# Patient Record
Sex: FEMALE | Race: WHITE | Employment: FULL TIME | URBAN - METROPOLITAN AREA
[De-identification: names, ages, dates, MRNs, and addresses within clinical notes are randomized per-mention and may not be internally consistent; named-entity substitution may affect disease eponyms.]

---

## 2017-02-03 ENCOUNTER — OFFICE VISIT (OUTPATIENT)
Dept: FAMILY MEDICINE | Facility: CLINIC | Age: 27
End: 2017-02-03

## 2017-02-03 VITALS
HEIGHT: 65 IN | TEMPERATURE: 98 F | WEIGHT: 142 LBS | DIASTOLIC BLOOD PRESSURE: 70 MMHG | SYSTOLIC BLOOD PRESSURE: 118 MMHG | HEART RATE: 64 BPM | BODY MASS INDEX: 23.66 KG/M2

## 2017-02-03 DIAGNOSIS — Z01.818 PREOP GENERAL PHYSICAL EXAM: Primary | ICD-10-CM

## 2017-02-03 DIAGNOSIS — N97.9 FEMALE INFERTILITY: ICD-10-CM

## 2017-02-03 DIAGNOSIS — I49.3 PVC (PREMATURE VENTRICULAR CONTRACTION): ICD-10-CM

## 2017-02-03 DIAGNOSIS — B00.1 RECURRENT COLD SORES: ICD-10-CM

## 2017-02-03 DIAGNOSIS — Z71.89 PRENATAL CONSULT: ICD-10-CM

## 2017-02-03 DIAGNOSIS — N80.9 ENDOMETRIOSIS: ICD-10-CM

## 2017-02-03 LAB
ERYTHROCYTE [DISTWIDTH] IN BLOOD BY AUTOMATED COUNT: 10.8 %
HCT VFR BLD AUTO: 42.5 % (ref 35–47)
HEMOGLOBIN: 13.9 G/DL (ref 11.7–15.7)
MCH RBC QN AUTO: 29.5 PG (ref 26–33)
MCHC RBC AUTO-ENTMCNC: 32.7 G/DL (ref 31–36)
MCV RBC AUTO: 90.3 FL (ref 78–100)
PLATELET COUNT - QUEST: 277 10^9/L (ref 150–375)
PREG. TEST: NORMAL
RBC # BLD AUTO: 4.71 10*12/L (ref 3.8–5.2)
WBC # BLD AUTO: 9.8 10*9/L (ref 4–11)

## 2017-02-03 PROCEDURE — 99214 OFFICE O/P EST MOD 30 MIN: CPT | Performed by: PHYSICIAN ASSISTANT

## 2017-02-03 PROCEDURE — 81025 URINE PREGNANCY TEST: CPT | Performed by: PHYSICIAN ASSISTANT

## 2017-02-03 PROCEDURE — 85027 COMPLETE CBC AUTOMATED: CPT | Performed by: PHYSICIAN ASSISTANT

## 2017-02-03 PROCEDURE — 36415 COLL VENOUS BLD VENIPUNCTURE: CPT | Performed by: PHYSICIAN ASSISTANT

## 2017-02-03 RX ORDER — VALACYCLOVIR HYDROCHLORIDE 1 G/1
2000 TABLET, FILM COATED ORAL 2 TIMES DAILY
Qty: 4 TABLET | Refills: 11 | Status: SHIPPED | OUTPATIENT
Start: 2017-02-03 | End: 2017-11-10

## 2017-02-03 RX ORDER — PRENATAL VIT/IRON FUM/FOLIC AC 27MG-0.8MG
1 TABLET ORAL DAILY
Qty: 100 TABLET | Refills: 3 | Status: SHIPPED | OUTPATIENT
Start: 2017-02-03 | End: 2018-04-25

## 2017-02-03 NOTE — Clinical Note
[facility]  [address]  [address]  [facility] [address]  [phone]  Dept: [phone]          PRE-OP EVALUATION:  Today's date: 2/3/2017    Negar Vizcarra (: 1990) presents for pre-operative evaluation assessment as requested by Dr. Bishop.  She requires evaluation and anesthesia risk assessment prior to undergoing surgery/procedure for treatment of Left foot bunion removal .  Proposed procedure: left bunionectomy    Date of Surgery/ Procedure: 02/10/2017  Time of Surgery/ Procedure: AM  Hospital/Surgical Facility: [facility], [place]    Fax :[phone]  Primary Physician: Ce Siddiqi  Type of Anesthesia Anticipated: to be determined    Patient has a Health Care Directive or Living Will:  NO    1. NO - Do you have a history of heart attack, stroke, stent, bypass or surgery on an artery in the head, neck, heart or legs?  2. NO - Do you ever have any pain or discomfort in your chest?  3. NO - Do you have a history of  Heart Failure?  4. NO - Are you troubled by shortness of breath when: walking on the level, up a slight hill or at night?  5. NO - Do you currently have a cold, bronchitis or other respiratory infection?  6. NO - Do you have a cough, shortness of breath or wheezing?  7. NO - Do you sometimes get pains in the calves of your legs when you walk?  8. YES - Do you or anyone in your family have previous history of blood clots? Mother's father - reason unknown  9. NO - Do you or does anyone in your family have a serious bleeding problem such as prolonged bleeding following surgeries or cuts?  10. YES - Have you ever had problems with anemia or been told to take iron pills? As a child  11. NO - Have you had any abnormal blood loss such as black, tarry or bloody stools, or abnormal vaginal bleeding?  12. NO - Have you ever had a blood transfusion?  13. YES - Have you or any of your relatives ever had problems with anesthesia?  PONV  14. NO - Do you have sleep apnea, excessive snoring or daytime drowsiness?  15. NO - Do you have any prosthetic heart valves?  16. NO - Do you have prosthetic joints?  17. NO - Is there any chance that you may be pregnant?      HPI:                                                      Brief HPI related to upcoming procedure: Left foot pain.       See problem list for active medical problems.  Problems all longstanding and stable, except as noted/documented.  See ROS for pertinent symptoms related to these conditions.                                                                                                  .    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  Ultrasound 2016       Endometriosis 02/06/2014     Priority: Medium     Stage one endometriosis noted in the pelvis.  Excision of 5mm lesion from right uterosacral ligament and cauterization of an additional 7 implants done.   Tubal dye studies with patent tubes.  Distal tubes with no adhesions or abnormalities, fimbria fine and delicate.  Upper abdomen with no adhesions. MD Rhea       ACP (advance care planning) 12/20/2013     Priority: Medium     Advance Care Planning:   ACP Review and Resources Provided:  Reviewed chart for advance care plan.  Negar Vizcarra has no plan or code status on file. Discussed available resources and provided with information. Confirmed code status reflects current choices pending further ACP discussions.  Confirmed/documented designated decision maker(s). See permanent comments section of demographics in clinical tab.   Added by Cheyenne Woody on 2/11/2014               Female  infertility 12/20/2013     Priority: Medium     Health Care Home 12/20/2013     Priority: Low     State Tier Level:  Tier 1  Status:  NA  Care Coordinator:      See Letters for [facility] Care Plan            Past Medical History   Diagnosis Date     Dysmenorrhea      Gastro-oesophageal reflux disease      PVC (premature ventricular contraction)      Past Surgical History   Procedure Laterality Date     Cystectomy ovarian oncology  2008     [place]     Bunionectomy  2012     bilateral     Laparotomy exploratory       Laparoscopic tubal dye study  2/6/2014     Procedure: LAPAROSCOPIC TUBAL DYE STUDY;  LAPAROSCOPIC TUBAL DYE STUDIES, Cautery of Endometrosis, Excision Right Pelvic Endometrosis;  Surgeon: Daryl Jackson MD;  Location: [facility]     Laparoscopic ablation endometriosis  2/6/2014     Procedure: LAPAROSCOPIC ABLATION ENDOMETRIOSIS;;  Surgeon: Daryl Jackson MD;  Location: [facility]     Current Outpatient Prescriptions   Medication Sig Dispense Refill     valACYclovir (VALTREX) 1000 mg tablet Take 2 tablets (2,000 mg) by mouth 2 times daily 4 tablet 11     Prenatal Vit-Fe Fumarate-FA (PRENATAL MULTIVITAMIN  PLUS IRON) 27-0.8 MG TABS per tablet Take 1 tablet by mouth daily 100 tablet 3     minocycline (MINOCIN/DYNACIN) 100 MG capsule 1 tab PO BID 60 capsule 1     doxycycline Monohydrate 100 MG CAPS 1 tab PO BID with food and full glass of water 120 capsule 0     metroNIDAZOLE (METROCREAM) 0.75 % cream Apply to face and chest BID 45 g 11     fluconazole (DIFLUCAN) 150 MG tablet 1 tab PO at symptom onset; 1 tab 3 days later if not better 10 tablet 3     Sulfacetamide Sodium 10 % LIQD Use to wash face  mL 11     metoprolol (TOPROL XL) 25 MG 24 hr tablet Take 0.5 tablets (12.5 mg) by mouth daily 45 tablet 3     TRIMETHOPRIM PO Take 100 mg by mouth daily       OTC products: Aspirin daily.  Last dose today.    No Known Allergies   Latex Allergy: NO    Social History   Substance Use Topics     Smoking status:  "Never Smoker      Smokeless tobacco: Never Used     Alcohol Use: No     History   Drug Use No       REVIEW OF SYSTEMS:                                                    Constitutional, neuro, ENT, endocrine, pulmonary, cardiac, gastrointestinal, genitourinary, musculoskeletal, integument and psychiatric systems are negative, except as otherwise noted.    Cold sore on lower lip started today.  Gets cold sores rarely    EXAM:                                                    /70 mmHg  Pulse 64  Temp(Src) 98  F (36.7  C) (Oral)  Ht 1.651 m (5' 5\")  Wt 64.411 kg (142 lb)  BMI 23.63 kg/m2  LMP 01/23/2017    GENERAL APPEARANCE: healthy, alert and no distress     EYES: EOMI,- PERRL     HENT: ear canals and TM's normal and nose and mouth without ulcers or lesions     NECK: no adenopathy, no asymmetry, masses, or scars and thyroid normal to palpation     RESP: lungs clear to auscultation - no rales, rhonchi or wheezes     CV: regular rates and rhythm, normal S1 S2, no S3 or S4 and no murmur, click or rub -     ABDOMEN:  soft, nontender, no HSM or masses and bowel sounds normal    MS: extremities normal- no gross deformities noted, no evidence of inflammation in joints, FROM in all extremities.     SKIN: no suspicious lesions or rashes     NEURO: Normal strength and tone, sensory exam grossly normal, mentation intact and speech normal     PSYCH: mentation appears normal. and affect normal/bright      DIAGNOSTICS:                                                      Labs Resulted Today:   Results for orders placed or performed in visit on 02/03/17   HCL Urine Pregnancy Test (BFP)   Result Value Ref Range    Pregnancy Test neg    HEMOGRAM/PLATELET (BFP)   Result Value Ref Range    WBC 9.8 4.0 - 11 10*9/L    RBC Count 4.71 3.8 - 5.2 10*12/L    Hemoglobin 13.9 11.7 - 15.7 g/dL    Hematocrit 42.5 35.0 - 47.0 %    MCV 90.3 78 - 100 fL    MCH 29.5 26 - 33 pg    MCHC 32.7 31 - 36 g/dL    RDW 10.8 %    Platelet Count 277 " 150 - 375 10^9/L       Recent Labs   Lab Test  02/01/14   1844   HGB  13.3   PLT  261   NA  139   POTASSIUM  4.0   CR  0.75        IMPRESSION:                                                    Reason for surgery/procedure: Left bunionectomy  Diagnosis/reason for consult:  Preoperative clearance      The proposed surgical procedure is considered INTERMEDIATE risk.    REVISED CARDIAC RISK INDEX  The patient has the following serious cardiovascular risks for perioperative complications such as (MI, PE, VFib and 3  AV Block):  No serious cardiac risks  INTERPRETATION: 0 risks: Class I (very low risk - 0.4% complication rate)    The patient has the following additional risks for perioperative complications:  No identified additional risks      ICD-10-CM    1. Preop general physical exam Z01.818 HCL Urine Pregnancy Test (BFP)     VENOUS COLLECTION     HEMOGRAM/PLATELET (BFP)   2. Recurrent cold sores B00.1 valACYclovir (VALTREX) 1000 mg tablet   3. Prenatal consult Z71.89 Prenatal Vit-Fe Fumarate-FA (PRENATAL MULTIVITAMIN  PLUS IRON) 27-0.8 MG TABS per tablet   4. Female infertility N97.9    5. PVC (premature ventricular contraction) I49.3    6. Endometriosis N80.9        RECOMMENDATIONS:                                                        I would recommend a urine pregnancy test the day of the surgery.      Hold all meds day of surgery    APPROVAL GIVEN to proceed with proposed procedure, without further diagnostic evaluation       Signed Electronically by: Ce Siddiqi PA-C    Copy of this evaluation report is provided to requesting physician.    [facility] Preop Guidelines

## 2017-02-03 NOTE — PROGRESS NOTES
[facility]  [address]  [address]  [facility] [address]  [phone]  Dept: [phone]          PRE-OP EVALUATION:  Today's date: 2/3/2017    Negar Vizcarra (: 1990) presents for pre-operative evaluation assessment as requested by Dr. Bishop.  She requires evaluation and anesthesia risk assessment prior to undergoing surgery/procedure for treatment of Left foot bunion removal .  Proposed procedure: left bunionectomy    Date of Surgery/ Procedure: 02/10/2017  Time of Surgery/ Procedure: AM  Hospital/Surgical Facility: [facility]    Fax :[phone]  Primary Physician: Ce Siddiqi  Type of Anesthesia Anticipated: to be determined    Patient has a Health Care Directive or Living Will:  NO    1. NO - Do you have a history of heart attack, stroke, stent, bypass or surgery on an artery in the head, neck, heart or legs?  2. NO - Do you ever have any pain or discomfort in your chest?  3. NO - Do you have a history of  Heart Failure?  4. NO - Are you troubled by shortness of breath when: walking on the level, up a slight hill or at night?  5. NO - Do you currently have a cold, bronchitis or other respiratory infection?  6. NO - Do you have a cough, shortness of breath or wheezing?  7. NO - Do you sometimes get pains in the calves of your legs when you walk?  8. YES - Do you or anyone in your family have previous history of blood clots? Mother's father - reason unknown  9. NO - Do you or does anyone in your family have a serious bleeding problem such as prolonged bleeding following surgeries or cuts?  10. YES - Have you ever had problems with anemia or been told to take iron pills? As a child  11. NO - Have you had any abnormal blood loss such as black, tarry or bloody stools, or abnormal vaginal bleeding?  12. NO - Have you ever had a blood transfusion?  13. YES - Have you or any of your relatives ever had problems with anesthesia?  PONV  14. NO - Do you have sleep apnea, excessive snoring or daytime drowsiness?  15. NO - Do you have any prosthetic heart valves?  16. NO - Do you have prosthetic joints?  17. NO - Is there any chance that you may be pregnant?      HPI:                                                      Brief HPI related to upcoming procedure: Left foot pain.       See problem list for active medical problems.  Problems all longstanding and stable, except as noted/documented.  See ROS for pertinent symptoms related to these conditions.                                                                                                  .    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  Ultrasound 2016       Endometriosis 02/06/2014     Priority: Medium     Stage one endometriosis noted in the pelvis.  Excision of 5mm lesion from right uterosacral ligament and cauterization of an additional 7 implants done.   Tubal dye studies with patent tubes.  Distal tubes with no adhesions or abnormalities, fimbria fine and delicate.  Upper abdomen with no adhesions. MD Rhea       ACP (advance care planning) 12/20/2013     Priority: Medium     Advance Care Planning:   ACP Review and Resources Provided:  Reviewed chart for advance care plan.  Negar Vizcarra has no plan or code status on file. Discussed available resources and provided with information. Confirmed code status reflects current choices pending further ACP discussions.  Confirmed/documented designated decision maker(s). See permanent comments section of demographics in clinical tab.   Added by Cheyenne Woody on 2/11/2014               Female  infertility 12/20/2013     Priority: Medium     Health Care Home 12/20/2013     Priority: Low     State Tier Level:  Tier 1  Status:  NA  Care Coordinator:      See Letters for [facility] Care Plan            Past Medical History   Diagnosis Date     Dysmenorrhea      Gastro-oesophageal reflux disease      PVC (premature ventricular contraction)      Past Surgical History   Procedure Laterality Date     Cystectomy ovarian oncology  2008     [place]     Bunionectomy  2012     bilateral     Laparotomy exploratory       Laparoscopic tubal dye study  2/6/2014     Procedure: LAPAROSCOPIC TUBAL DYE STUDY;  LAPAROSCOPIC TUBAL DYE STUDIES, Cautery of Endometrosis, Excision Right Pelvic Endometrosis;  Surgeon: Daryl Jackson MD;  Location: [facility]     Laparoscopic ablation endometriosis  2/6/2014     Procedure: LAPAROSCOPIC ABLATION ENDOMETRIOSIS;;  Surgeon: Daryl Jackson MD;  Location: [facility]     Current Outpatient Prescriptions   Medication Sig Dispense Refill     valACYclovir (VALTREX) 1000 mg tablet Take 2 tablets (2,000 mg) by mouth 2 times daily 4 tablet 11     Prenatal Vit-Fe Fumarate-FA (PRENATAL MULTIVITAMIN  PLUS IRON) 27-0.8 MG TABS per tablet Take 1 tablet by mouth daily 100 tablet 3     minocycline (MINOCIN/DYNACIN) 100 MG capsule 1 tab PO BID 60 capsule 1     doxycycline Monohydrate 100 MG CAPS 1 tab PO BID with food and full glass of water 120 capsule 0     metroNIDAZOLE (METROCREAM) 0.75 % cream Apply to face and chest BID 45 g 11     fluconazole (DIFLUCAN) 150 MG tablet 1 tab PO at symptom onset; 1 tab 3 days later if not better 10 tablet 3     Sulfacetamide Sodium 10 % LIQD Use to wash face  mL 11     metoprolol (TOPROL XL) 25 MG 24 hr tablet Take 0.5 tablets (12.5 mg) by mouth daily 45 tablet 3     TRIMETHOPRIM PO Take 100 mg by mouth daily       OTC products: Aspirin daily.  Last dose today.    No Known Allergies   Latex Allergy: NO    Social History   Substance Use Topics     Smoking status:  "Never Smoker      Smokeless tobacco: Never Used     Alcohol Use: No     History   Drug Use No       REVIEW OF SYSTEMS:                                                    Constitutional, neuro, ENT, endocrine, pulmonary, cardiac, gastrointestinal, genitourinary, musculoskeletal, integument and psychiatric systems are negative, except as otherwise noted.    Cold sore on lower lip started today.  Gets cold sores rarely    EXAM:                                                    /70 mmHg  Pulse 64  Temp(Src) 98  F (36.7  C) (Oral)  Ht 1.651 m (5' 5\")  Wt 64.411 kg (142 lb)  BMI 23.63 kg/m2  LMP 01/23/2017    GENERAL APPEARANCE: healthy, alert and no distress     EYES: EOMI,- PERRL     HENT: ear canals and TM's normal and nose and mouth without ulcers or lesions     NECK: no adenopathy, no asymmetry, masses, or scars and thyroid normal to palpation     RESP: lungs clear to auscultation - no rales, rhonchi or wheezes     CV: regular rates and rhythm, normal S1 S2, no S3 or S4 and no murmur, click or rub -     ABDOMEN:  soft, nontender, no HSM or masses and bowel sounds normal    MS: extremities normal- no gross deformities noted, no evidence of inflammation in joints, FROM in all extremities.     SKIN: no suspicious lesions or rashes     NEURO: Normal strength and tone, sensory exam grossly normal, mentation intact and speech normal     PSYCH: mentation appears normal. and affect normal/bright      DIAGNOSTICS:                                                      Labs Resulted Today:   Results for orders placed or performed in visit on 02/03/17   HCL Urine Pregnancy Test (BFP)   Result Value Ref Range    Pregnancy Test neg    HEMOGRAM/PLATELET (BFP)   Result Value Ref Range    WBC 9.8 4.0 - 11 10*9/L    RBC Count 4.71 3.8 - 5.2 10*12/L    Hemoglobin 13.9 11.7 - 15.7 g/dL    Hematocrit 42.5 35.0 - 47.0 %    MCV 90.3 78 - 100 fL    MCH 29.5 26 - 33 pg    MCHC 32.7 31 - 36 g/dL    RDW 10.8 %    Platelet Count 277 " 150 - 375 10^9/L       Recent Labs   Lab Test  02/01/14   1844   HGB  13.3   PLT  261   NA  139   POTASSIUM  4.0   CR  0.75        IMPRESSION:                                                    Reason for surgery/procedure: Left bunionectomy  Diagnosis/reason for consult:  Preoperative clearance      The proposed surgical procedure is considered INTERMEDIATE risk.    REVISED CARDIAC RISK INDEX  The patient has the following serious cardiovascular risks for perioperative complications such as (MI, PE, VFib and 3  AV Block):  No serious cardiac risks  INTERPRETATION: 0 risks: Class I (very low risk - 0.4% complication rate)    The patient has the following additional risks for perioperative complications:  No identified additional risks      ICD-10-CM    1. Preop general physical exam Z01.818 HCL Urine Pregnancy Test (BFP)     VENOUS COLLECTION     HEMOGRAM/PLATELET (BFP)   2. Recurrent cold sores B00.1 valACYclovir (VALTREX) 1000 mg tablet   3. Prenatal consult Z71.89 Prenatal Vit-Fe Fumarate-FA (PRENATAL MULTIVITAMIN  PLUS IRON) 27-0.8 MG TABS per tablet   4. Female infertility N97.9    5. PVC (premature ventricular contraction) I49.3    6. Endometriosis N80.9        RECOMMENDATIONS:                                                        I would recommend a urine pregnancy test the day of the surgery.      Hold all meds day of surgery    APPROVAL GIVEN to proceed with proposed procedure, without further diagnostic evaluation       Signed Electronically by: Ce Siddiqi PA-C      Copy of this evaluation report is provided to requesting physician.    [facility] Preop Guidelines

## 2017-02-03 NOTE — Clinical Note
[facility]  [address]  [address]  [facility] [address]  [phone]  Dept: [phone]          PRE-OP EVALUATION:  Today's date: 2/3/2017    Negar Vizcarra (: 1990) presents for pre-operative evaluation assessment as requested by Dr. Bishop.  She requires evaluation and anesthesia risk assessment prior to undergoing surgery/procedure for treatment of Left foot bunion removal .  Proposed procedure: left bunionectomy    Date of Surgery/ Procedure: 02/10/2017  Time of Surgery/ Procedure: AM  Hospital/Surgical Facility[facility] [place]    Fax :[phone]  Primary Physician: Ce Siddiqi  Type of Anesthesia Anticipated: to be determined    Patient has a Health Care Directive or Living Will:  NO    1. NO - Do you have a history of heart attack, stroke, stent, bypass or surgery on an artery in the head, neck, heart or legs?  2. NO - Do you ever have any pain or discomfort in your chest?  3. NO - Do you have a history of  Heart Failure?  4. NO - Are you troubled by shortness of breath when: walking on the level, up a slight hill or at night?  5. NO - Do you currently have a cold, bronchitis or other respiratory infection?  6. NO - Do you have a cough, shortness of breath or wheezing?  7. NO - Do you sometimes get pains in the calves of your legs when you walk?  8. YES - Do you or anyone in your family have previous history of blood clots? Mother's father - reason unknown  9. NO - Do you or does anyone in your family have a serious bleeding problem such as prolonged bleeding following surgeries or cuts?  10. YES - Have you ever had problems with anemia or been told to take iron pills? As a child  11. NO - Have you had any abnormal blood loss such as black, tarry or bloody stools, or abnormal vaginal bleeding?  12. NO - Have you ever had a blood transfusion?  13. YES - Have you or any of your relatives ever had problems with anesthesia?  PONV  14. NO - Do you have sleep apnea, excessive snoring or daytime drowsiness?  15. NO - Do you have any prosthetic heart valves?  16. NO - Do you have prosthetic joints?  17. NO - Is there any chance that you may be pregnant?      HPI:                                                      Brief HPI related to upcoming procedure: Left foot pain.       See problem list for active medical problems.  Problems all longstanding and stable, except as noted/documented.  See ROS for pertinent symptoms related to these conditions.                                                                                                  .    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  Ultrasound 2016       Endometriosis 02/06/2014     Priority: Medium     Stage one endometriosis noted in the pelvis.  Excision of 5mm lesion from right uterosacral ligament and cauterization of an additional 7 implants done.   Tubal dye studies with patent tubes.  Distal tubes with no adhesions or abnormalities, fimbria fine and delicate.  Upper abdomen with no adhesions. MD Rhea       ACP (advance care planning) 12/20/2013     Priority: Medium     Advance Care Planning:   ACP Review and Resources Provided:  Reviewed chart for advance care plan.  Negar Vizcarra has no plan or code status on file. Discussed available resources and provided with information. Confirmed code status reflects current choices pending further ACP discussions.  Confirmed/documented designated decision maker(s). See permanent comments section of demographics in clinical tab.   Added by Cheyenne Woody on 2/11/2014               Female  infertility 12/20/2013     Priority: Medium     Health Care Home 12/20/2013     Priority: Low     State Tier Level:  Tier 1  Status:  NA  Care Coordinator:      See Letters for [facility] Care Plan            Past Medical History   Diagnosis Date     Dysmenorrhea      Gastro-oesophageal reflux disease      PVC (premature ventricular contraction)      Past Surgical History   Procedure Laterality Date     Cystectomy ovarian oncology  2008     [place]     Bunionectomy  2012     bilateral     Laparotomy exploratory       Laparoscopic tubal dye study  2/6/2014     Procedure: LAPAROSCOPIC TUBAL DYE STUDY;  LAPAROSCOPIC TUBAL DYE STUDIES, Cautery of Endometrosis, Excision Right Pelvic Endometrosis;  Surgeon: Daryl Jackson MD;  Location: [facility]     Laparoscopic ablation endometriosis  2/6/2014     Procedure: LAPAROSCOPIC ABLATION ENDOMETRIOSIS;;  Surgeon: Daryl Jackson MD;  Location: [facility]     Current Outpatient Prescriptions   Medication Sig Dispense Refill     valACYclovir (VALTREX) 1000 mg tablet Take 2 tablets (2,000 mg) by mouth 2 times daily 4 tablet 11     Prenatal Vit-Fe Fumarate-FA (PRENATAL MULTIVITAMIN  PLUS IRON) 27-0.8 MG TABS per tablet Take 1 tablet by mouth daily 100 tablet 3     minocycline (MINOCIN/DYNACIN) 100 MG capsule 1 tab PO BID 60 capsule 1     doxycycline Monohydrate 100 MG CAPS 1 tab PO BID with food and full glass of water 120 capsule 0     metroNIDAZOLE (METROCREAM) 0.75 % cream Apply to face and chest BID 45 g 11     fluconazole (DIFLUCAN) 150 MG tablet 1 tab PO at symptom onset; 1 tab 3 days later if not better 10 tablet 3     Sulfacetamide Sodium 10 % LIQD Use to wash face  mL 11     metoprolol (TOPROL XL) 25 MG 24 hr tablet Take 0.5 tablets (12.5 mg) by mouth daily 45 tablet 3     TRIMETHOPRIM PO Take 100 mg by mouth daily       OTC products: Aspirin daily.  Last dose today.    No Known Allergies   Latex Allergy: NO    Social History   Substance Use Topics     Smoking status:  "Never Smoker      Smokeless tobacco: Never Used     Alcohol Use: No     History   Drug Use No       REVIEW OF SYSTEMS:                                                    Constitutional, neuro, ENT, endocrine, pulmonary, cardiac, gastrointestinal, genitourinary, musculoskeletal, integument and psychiatric systems are negative, except as otherwise noted.    Cold sore on lower lip started today.  Gets cold sores rarely    EXAM:                                                    /70 mmHg  Pulse 64  Temp(Src) 98  F (36.7  C) (Oral)  Ht 1.651 m (5' 5\")  Wt 64.411 kg (142 lb)  BMI 23.63 kg/m2  LMP 01/23/2017    GENERAL APPEARANCE: healthy, alert and no distress     EYES: EOMI,- PERRL     HENT: ear canals and TM's normal and nose and mouth without ulcers or lesions     NECK: no adenopathy, no asymmetry, masses, or scars and thyroid normal to palpation     RESP: lungs clear to auscultation - no rales, rhonchi or wheezes     BREAST: normal without masses, tenderness or nipple discharge and no palpable axillary masses or adenopathy     CV: regular rates and rhythm, normal S1 S2, no S3 or S4 and no murmur, click or rub -     ABDOMEN:  soft, nontender, no HSM or masses and bowel sounds normal     : normal cervix, adnexae, and uterus without masses or discharge and rectal exam normal without masses-guaiac negative stool     MS: extremities normal- no gross deformities noted, no evidence of inflammation in joints, FROM in all extremities.     SKIN: no suspicious lesions or rashes     NEURO: Normal strength and tone, sensory exam grossly normal, mentation intact and speech normal     PSYCH: mentation appears normal. and affect normal/bright     LYMPHATICS: No axillary, cervical, inguinal, or supraclavicular nodes    DIAGNOSTICS:                                                      Labs Resulted Today:   Results for orders placed or performed in visit on 02/03/17   HCL Urine Pregnancy Test (BFP)   Result Value Ref " Range    Pregnancy Test neg    HEMOGRAM/PLATELET (BFP)   Result Value Ref Range    WBC 9.8 4.0 - 11 10*9/L    RBC Count 4.71 3.8 - 5.2 10*12/L    Hemoglobin 13.9 11.7 - 15.7 g/dL    Hematocrit 42.5 35.0 - 47.0 %    MCV 90.3 78 - 100 fL    MCH 29.5 26 - 33 pg    MCHC 32.7 31 - 36 g/dL    RDW 10.8 %    Platelet Count 277 150 - 375 10^9/L       Recent Labs   Lab Test  02/01/14   1844   HGB  13.3   PLT  261   NA  139   POTASSIUM  4.0   CR  0.75        IMPRESSION:                                                    Reason for surgery/procedure: Left bunionectomy  Diagnosis/reason for consult:  Preoperative clearance      The proposed surgical procedure is considered INTERMEDIATE risk.    REVISED CARDIAC RISK INDEX  The patient has the following serious cardiovascular risks for perioperative complications such as (MI, PE, VFib and 3  AV Block):  No serious cardiac risks  INTERPRETATION: 0 risks: Class I (very low risk - 0.4% complication rate)    The patient has the following additional risks for perioperative complications:  No identified additional risks      ICD-10-CM    1. Preop general physical exam Z01.818 HCL Urine Pregnancy Test (BFP)     VENOUS COLLECTION     HEMOGRAM/PLATELET (BFP)   2. Recurrent cold sores B00.1 valACYclovir (VALTREX) 1000 mg tablet   3. Prenatal consult Z71.89 Prenatal Vit-Fe Fumarate-FA (PRENATAL MULTIVITAMIN  PLUS IRON) 27-0.8 MG TABS per tablet   4. Female infertility N97.9    5. PVC (premature ventricular contraction) I49.3    6. Endometriosis N80.9        RECOMMENDATIONS:                                                        I would recommend a urine pregnancy test the day of the surgery.      Hold all meds day of surgery    APPROVAL GIVEN to proceed with proposed procedure, without further diagnostic evaluation       Signed Electronically by: Ce Siddiqi PA-C    Copy of this evaluation report is provided to requesting physician.    [facility] Preop Guidelines

## 2017-02-07 ENCOUNTER — TELEPHONE (OUTPATIENT)
Dept: FAMILY MEDICINE | Facility: CLINIC | Age: 27
End: 2017-02-07

## 2017-02-07 DIAGNOSIS — Z01.818 PREOP GENERAL PHYSICAL EXAM: Primary | ICD-10-CM

## 2017-02-07 NOTE — TELEPHONE ENCOUNTER
[facility] Lab orders written - to be drawn Thursday.   notified.    Ce Siddiqi PA-C  2/7/2017

## 2017-02-07 NOTE — TELEPHONE ENCOUNTER
Dr. Blair podiatry concerned that pt not abstaining from intercourse prior to surgery.     Pt needs serum HCG on Thursday.    We will Fax to [facility] with results     Fax no: [phone]    Ce Siddiqi PA-C  2/7/2017

## 2017-02-07 NOTE — TELEPHONE ENCOUNTER
pt called the clinical support line with the following;     Bharati called [phone]    Says surgical center is requesting a hCG blood test before her preop. Pt would like to come in for a lab only, on thursday, pt preop is on 10 Friday?  Please put in order or advise, Jo Ann  [phone] (home)

## 2017-02-08 ENCOUNTER — TELEPHONE (OUTPATIENT)
Dept: DERMATOLOGY | Facility: CLINIC | Age: 27
End: 2017-02-08

## 2017-02-08 NOTE — TELEPHONE ENCOUNTER
Reason for Call:  Other call back    Detailed comments: Pt is trying to get pregnant and is on some medications that she shouldn't be on for that reason.    Phone Number Patient can be reached at: Other phone number:  [phone]  Ce Siddiqi, a Physician Assistant from  [facility]    Best Time: 9am-3pm-- 2/10/17    Can we leave a detailed message on this number? YES    Call taken on 2/8/2017 at 2:45 PM by HEMALATHA SOLIS

## 2017-02-09 ENCOUNTER — HOSPITAL ENCOUNTER (OUTPATIENT)
Dept: LAB | Facility: CLINIC | Age: 27
Discharge: HOME | End: 2017-02-09
Attending: PHYSICIAN ASSISTANT | Admitting: PHYSICIAN ASSISTANT
Payer: COMMERCIAL

## 2017-02-09 DIAGNOSIS — Z01.818 PREOP EXAMINATION: Primary | ICD-10-CM

## 2017-02-09 LAB — B-HCG SERPL-ACNC: <1 IU/L

## 2017-02-09 PROCEDURE — 84702 CHORIONIC GONADOTROPIN TEST: CPT | Performed by: PHYSICIAN ASSISTANT

## 2017-02-09 PROCEDURE — 36415 COLL VENOUS BLD VENIPUNCTURE: CPT | Performed by: PHYSICIAN ASSISTANT

## 2017-02-09 NOTE — TELEPHONE ENCOUNTER
Called Ce back. She just wanted to  Know that we are aware patient was trying to get pregnant. Marta is aware. Issue discussed with patient. Pt been trying to get pregnant for a while and pt was notified she needs to stop all abx if she does get pregnant. Pt is currently not taking the meds listed below.

## 2017-02-24 ENCOUNTER — OFFICE VISIT (OUTPATIENT)
Dept: FAMILY MEDICINE | Facility: CLINIC | Age: 27
End: 2017-02-24

## 2017-02-24 VITALS
SYSTOLIC BLOOD PRESSURE: 126 MMHG | BODY MASS INDEX: 23.6 KG/M2 | OXYGEN SATURATION: 98 % | DIASTOLIC BLOOD PRESSURE: 84 MMHG | TEMPERATURE: 97.8 F | WEIGHT: 141.8 LBS | HEART RATE: 89 BPM

## 2017-02-24 DIAGNOSIS — B37.31 YEAST INFECTION OF THE VAGINA: Primary | ICD-10-CM

## 2017-02-24 DIAGNOSIS — N76.0 BACTERIAL VAGINAL INFECTION: ICD-10-CM

## 2017-02-24 DIAGNOSIS — B96.89 BACTERIAL VAGINAL INFECTION: ICD-10-CM

## 2017-02-24 DIAGNOSIS — N30.00 ACUTE CYSTITIS WITHOUT HEMATURIA: ICD-10-CM

## 2017-02-24 LAB
ALBUMIN (URINE): ABNORMAL MG/DL
APPEARANCE UR: ABNORMAL
BACTERIA (WET PREP): ABNORMAL
BACTERIA, UR: ABNORMAL
BILIRUB UR QL: ABNORMAL
CASTS/LPF: ABNORMAL
CLUE CELLS: ABNORMAL
COLOR UR: YELLOW
EP/HPF: ABNORMAL
GLUCOSE URINE: ABNORMAL MG/DL
HGB UR QL: ABNORMAL
KETONES UR QL: ABNORMAL MG/DL
LEUKOCYTE ESTERASE - QUEST: ABNORMAL
MISC.: ABNORMAL
NITRITE UR QL STRIP: ABNORMAL
PH FLUID SOURCE: 6 (ref 3.5–4.5)
PH UR STRIP: 7 PH (ref 5–7)
PMNS (WET PREP): ABNORMAL
RBC, UR MICRO: ABNORMAL (ref ?–2)
SP. GRAVITY: 1.01
TRICHOMONAS VAGINALS: ABNORMAL
UROBILINOGEN UR QL STRIP: 0.2 EU/DL (ref 0.2–1)
WBC, UR MICRO: ABNORMAL (ref ?–2)
YEAST CELLS: ABNORMAL

## 2017-02-24 PROCEDURE — 81001 URINALYSIS AUTO W/SCOPE: CPT | Performed by: PHYSICIAN ASSISTANT

## 2017-02-24 PROCEDURE — 99213 OFFICE O/P EST LOW 20 MIN: CPT | Performed by: PHYSICIAN ASSISTANT

## 2017-02-24 PROCEDURE — 87088 URINE BACTERIA CULTURE: CPT | Mod: 90 | Performed by: PHYSICIAN ASSISTANT

## 2017-02-24 PROCEDURE — 87210 SMEAR WET MOUNT SALINE/INK: CPT | Performed by: PHYSICIAN ASSISTANT

## 2017-02-24 PROCEDURE — 87086 URINE CULTURE/COLONY COUNT: CPT | Mod: 90 | Performed by: PHYSICIAN ASSISTANT

## 2017-02-24 RX ORDER — TAMSULOSIN HYDROCHLORIDE 0.4 MG/1
0.4 CAPSULE ORAL DAILY
COMMUNITY
End: 2017-05-18

## 2017-02-24 RX ORDER — CIPROFLOXACIN 500 MG/1
500 TABLET, FILM COATED ORAL 2 TIMES DAILY
Qty: 14 TABLET | Refills: 0 | Status: SHIPPED | OUTPATIENT
Start: 2017-02-24 | End: 2017-05-18

## 2017-02-24 RX ORDER — SULFAMETHOXAZOLE AND TRIMETHOPRIM 400; 80 MG/1; MG/1
TABLET ORAL
Qty: 30 TABLET | Refills: 0 | Status: CANCELLED | OUTPATIENT
Start: 2017-02-24

## 2017-02-24 RX ORDER — METRONIDAZOLE 7.5 MG/G
1 GEL VAGINAL 2 TIMES DAILY
Qty: 70 G | Refills: 0 | Status: SHIPPED | OUTPATIENT
Start: 2017-02-24 | End: 2017-03-01

## 2017-02-24 RX ORDER — NITROFURANTOIN 25; 75 MG/1; MG/1
CAPSULE ORAL
Qty: 20 CAPSULE | Refills: 0 | Status: CANCELLED | OUTPATIENT
Start: 2017-02-24

## 2017-02-24 NOTE — PROGRESS NOTES
SUBJECTIVE:                                                    Negar Vizcarra is a 26 year old female who presents to clinic today for the following health issues:    UTI sx: Monday am after intercourse - frequency, increased dysuria.  Took Macrobid - makes her nauseous  Took Flomax that evening  Sx improved over the next couple days    This am has also had vaginal itching, clear discharge - stringy.     Took Diflucan today.     Due to language barrier, an  was present during the history-taking and subsequent discussion (and for part of the physical exam) with this patient.        Labs reviewed in EPIC  BP Readings from Last 3 Encounters:   02/24/17 126/84   02/03/17 118/70   12/30/16 140/84    Wt Readings from Last 3 Encounters:   02/24/17 64.3 kg (141 lb 12.8 oz)   02/03/17 64.4 kg (142 lb)   10/14/16 63.5 kg (140 lb)                  Patient Active Problem List   Diagnosis     ACP (advance care planning)     Health Care Home     Female infertility     Endometriosis     Breast cyst, right     PVC (premature ventricular contraction)     Recurrent cold sores     Past Surgical History   Procedure Laterality Date     Cystectomy ovarian oncology  2008     [place]     Bunionectomy  2012     bilateral     Laparotomy exploratory       Laparoscopic tubal dye study  2/6/2014     Procedure: LAPAROSCOPIC TUBAL DYE STUDY;  LAPAROSCOPIC TUBAL DYE STUDIES, Cautery of Endometrosis, Excision Right Pelvic Endometrosis;  Surgeon: Daryl Jackson MD;  Location: [facility]     Laparoscopic ablation endometriosis  2/6/2014     Procedure: LAPAROSCOPIC ABLATION ENDOMETRIOSIS;;  Surgeon: Daryl Jackson MD;  Location: [facility]       Social History   Substance Use Topics     Smoking status: Never Smoker     Smokeless tobacco: Never Used     Alcohol use No     Family History   Problem Relation Age of Onset     Hypertension Mother      Hypertension Father      DIABETES Maternal Grandmother      CEREBROVASCULAR DISEASE Maternal  Grandfather      CEREBROVASCULAR DISEASE Father      Lipids Father          Current Outpatient Prescriptions   Medication Sig Dispense Refill     tamsulosin (FLOMAX) 0.4 MG capsule Take 0.4 mg by mouth daily       metroNIDAZOLE (METROGEL) 0.75 % vaginal gel Place 1 applicator vaginally 2 times daily for 5 days 70 g 0     ciprofloxacin (CIPRO) 500 MG tablet Take 1 tablet (500 mg) by mouth 2 times daily 14 tablet 0     valACYclovir (VALTREX) 1000 mg tablet Take 2 tablets (2,000 mg) by mouth 2 times daily 4 tablet 11     metroNIDAZOLE (METROCREAM) 0.75 % cream Apply to face and chest BID 45 g 11     Sulfacetamide Sodium 10 % LIQD Use to wash face  mL 11     Prenatal Vit-Fe Fumarate-FA (PRENATAL MULTIVITAMIN  PLUS IRON) 27-0.8 MG TABS per tablet Take 1 tablet by mouth daily (Patient not taking: Reported on 2/24/2017) 100 tablet 3     metoprolol (TOPROL XL) 25 MG 24 hr tablet Take 0.5 tablets (12.5 mg) by mouth daily (Patient not taking: Reported on 2/24/2017) 45 tablet 3     TRIMETHOPRIM PO Take 100 mg by mouth daily Reported on 2/24/2017       Allergies   Allergen Reactions     Doxycycline Nausea and Nausea and Vomiting     Recent Labs   Lab Test  02/01/14   1844   ALT  26   CR  0.75   GFRESTIMATED  >90   GFRESTBLACK  >90   POTASSIUM  4.0            OBJECTIVE:                                                    /84 (BP Location: Right arm, Patient Position: Chair, Cuff Size: Adult Regular)  Pulse 89  Temp 97.8  F (36.6  C) (Oral)  Wt 64.3 kg (141 lb 12.8 oz)  LMP 01/23/2017  SpO2 98%  BMI 23.6 kg/m2   Body mass index is 23.6 kg/(m^2).   GENERAL: healthy, alert, well nourished, well hydrated, no distress  CV: regular rates and rhythm, normal S1 S2, no S3 or S4 and no murmur, no click or rub -  ABDOMEN: soft, no tenderness, no  hepatosplenomegaly, no masses, normal bowel sounds  Gu: normal appearance of external genitalia, vaginal mucosa.  Mild white thick discharge present.     Labs Resulted Today:   Results  for orders placed or performed in visit on 02/24/17   HCL URINALYSIS, ROUTINE   Result Value Ref Range    Color Urine Yellow     Appearance Urine Cloudy (A)     Glucose Urine Neg neg mg/dL    Bilirubin Urine Neg neg    Ketones Urine Neg neg mg/dL    Specific Gravity 1.015     Blood Urine Small (A) neg    pH Urine 7.0 5.0 - 7.0 pH    Albumin Urine neg neg - neg mg/dL    Urobilinogen Urine 0.2 0.2 - 1.0 EU/dL    Nitrite Urine Neg NEG    Leukocyte Esterase Large (A) neg - neg    Wbc, Urine Micro 50-60 (A) neg - 2    RBC Micro Urine 3-5 (A) neg - 2    EP/HPF few     Bacteria Urine small (A) neg - neg    Casts/LPF neg     Miscellaneous WBC clumping (A)    A WET PREP (BFP)   Result Value Ref Range    Trichomonas Vaginals NEG     yeast cells neg     PMNs Wet Prep small (A)     Clue cells POS (A)     Bacteria (Wet Prep) LARGE-BIJU (A)     pH Fluid Source 6.0 (A) 3.5 - 4.5            ASSESSMENT/PLAN:                                                      (B37.3) Yeast infection of the vagina  (primary encounter diagnosis)  Plan: A WET PREP (BFP)  Repeat diflucan if sx persist after Metrogel    (N30.00) Acute cystitis without hematuria  Plan: Urine Culture Aerobic Bacterial  Cirpo  Advised prn abx with intercourse   Will discuss with urologist in 2 weeks    (N76.0,  B96.89) Bacterial vaginal infection  Plan: metroNIDAZOLE (METROGEL) 0.75 % vaginal gel     No EtOH, no intercourse  During tx.         GABY Chou  [facility]

## 2017-02-24 NOTE — MR AVS SNAPSHOT
After Visit Summary   2/24/2017    Negar Vizcarra    MRN: [id number]           Patient Information     Date Of Birth          1990        Visit Information        Provider Department      2/24/2017 1:45 PM Ce Siddiqi PA [facility]        Today's Diagnoses     Yeast infection of the vagina    -  1    Acute cystitis without hematuria        Bacterial vaginal infection           Follow-ups after your visit        Who to contact     If you have questions or need follow up information about today's clinic visit or your schedule please contact [facility] directly at [phone].  Normal or non-critical lab and imaging results will be communicated to you by MyChart, letter or phone within 4 business days after the clinic has received the results. If you do not hear from us within 7 days, please contact the clinic through MyChart or phone. If you have a critical or abnormal lab result, we will notify you by phone as soon as possible.  Submit refill requests through [facility] or call your pharmacy and they will forward the refill request to us. Please allow 3 business days for your refill to be completed.          Additional Information About Your Visit        Care EveryWhere ID     This is your Care EveryWhere ID. This could be used by other organizations to access your [facility] medical records  [id number]        Your Vitals Were     Pulse Temperature Last Period Pulse Oximetry BMI (Body Mass Index)       89 97.8  F (36.6  C) (Oral) 01/23/2017 98% 23.6 kg/m2        Blood Pressure from Last 3 Encounters:   02/24/17 126/84   02/03/17 118/70   12/30/16 140/84    Weight from Last 3 Encounters:   02/24/17 64.3 kg (141 lb 12.8 oz)   02/03/17 64.4 kg (142 lb)   10/14/16 63.5 kg (140 lb)              We Performed the Following     A WET PREP (BFP)     HCL URINALYSIS, ROUTINE     Urine Culture Aerobic Bacterial          Today's Medication Changes           These changes are accurate as of: 2/24/17 11:59 PM.  If you have any questions, ask your nurse or doctor.               Start taking these medicines.        Dose/Directions    ciprofloxacin 500 MG tablet   Commonly known as:  CIPRO   Used for:  Acute cystitis without hematuria   Started by:  Ce Siddiqi PA        Dose:  500 mg   Take 1 tablet (500 mg) by mouth 2 times daily   Quantity:  14 tablet   Refills:  0       metroNIDAZOLE 0.75 % vaginal gel   Commonly known as:  METROGEL   Used for:  Bacterial vaginal infection   Started by:  Ce Siddiqi PA        Dose:  1 applicator   Place 1 applicator vaginally 2 times daily for 5 days   Quantity:  70 g   Refills:  0            Where to get your medicines      These medications were sent to [facility] [address] [place] [address] [address] AT [facility]  [address], [facility] [address]     Phone:  [phone]     ciprofloxacin 500 MG tablet    metroNIDAZOLE 0.75 % vaginal gel                Primary Care Provider Office Phone # Fax #    GABY Chou [phone]       [facility]  [address]  [facility] [address]        Thank you!     Thank you for choosing [facility]  for your care. Our goal is always to provide you with excellent care. Hearing back from our patients is one way we can continue to improve our services. Please take a few minutes to complete the written survey that you may receive in the mail after your visit with us. Thank you!             Your Updated Medication List - Protect others around you: Learn how to safely use, store and throw away your medicines at www.disposemymeds.org.          This list is accurate as of: 2/24/17 11:59 PM.  Always use your most recent med list.                   Brand Name Dispense Instructions for use    ciprofloxacin 500 MG tablet    CIPRO    14 tablet     Take 1 tablet (500 mg) by mouth 2 times daily       metoprolol 25 MG 24 hr tablet    TOPROL XL    45 tablet    Take 0.5 tablets (12.5 mg) by mouth daily       metroNIDAZOLE 0.75 % cream    METROCREAM    45 g    Apply to face and chest BID       metroNIDAZOLE 0.75 % vaginal gel    METROGEL    70 g    Place 1 applicator vaginally 2 times daily for 5 days       prenatal multivitamin  plus iron 27-0.8 MG Tabs per tablet     100 tablet    Take 1 tablet by mouth daily       Sulfacetamide Sodium 10 % Liqd     340 mL    Use to wash face BID       tamsulosin 0.4 MG capsule    FLOMAX     Take 0.4 mg by mouth daily       TRIMETHOPRIM PO      Take 100 mg by mouth daily Reported on 2/24/2017       valACYclovir 1000 mg tablet    VALTREX    4 tablet    Take 2 tablets (2,000 mg) by mouth 2 times daily

## 2017-02-24 NOTE — NURSING NOTE
Negar is here for a possible UTI or yeast infection.     Pre-Visit Screening :  Immunizations : up to date  Colonoscopy : na  Mammogram : na  Asthma Action Test/Plan : na  PHQ9/GAD7 :  Na    Pulse - regular  My Chart - declines    CLASSIFICATION OF OVERWEIGHT AND OBESITY BY BMI                         Obesity Class           BMI(kg/m2)  Underweight                                    < 18.5  Normal                                         18.5-24.9  Overweight                                     25.0-29.9  OBESITY                     I                  30.0-34.9                              II                 35.0-39.9  EXTREME OBESITY             III                >40                             Patient's  BMI Body mass index is 23.6 kg/(m^2).  http://hin.nhlbi.nih.gov/menuplanner/menu.cgi  Questioned patient about current smoking habits.  Pt. has never smoked.  Cata.SOPHIE Chen ([facility])

## 2017-02-26 LAB — URINE VOIDED CULTURE: NORMAL

## 2017-02-28 ENCOUNTER — TELEPHONE (OUTPATIENT)
Dept: FAMILY MEDICINE | Facility: CLINIC | Age: 27
End: 2017-02-28

## 2017-02-28 NOTE — TELEPHONE ENCOUNTER
Bharati Dominican   [phone]      Called and LM on Bharati's VM that  neg  See urology as planned.    Ce Siddiqi PA-C  2/28/2017

## 2017-03-29 ENCOUNTER — TRANSFERRED RECORDS (OUTPATIENT)
Dept: FAMILY MEDICINE | Facility: CLINIC | Age: 27
End: 2017-03-29

## 2017-05-18 ENCOUNTER — TELEPHONE (OUTPATIENT)
Dept: FAMILY MEDICINE | Facility: CLINIC | Age: 27
End: 2017-05-18

## 2017-05-18 ENCOUNTER — OFFICE VISIT (OUTPATIENT)
Dept: FAMILY MEDICINE | Facility: CLINIC | Age: 27
End: 2017-05-18

## 2017-05-18 VITALS
HEART RATE: 86 BPM | WEIGHT: 140.4 LBS | HEIGHT: 65 IN | DIASTOLIC BLOOD PRESSURE: 80 MMHG | TEMPERATURE: 97.9 F | SYSTOLIC BLOOD PRESSURE: 120 MMHG | OXYGEN SATURATION: 99 % | BODY MASS INDEX: 23.39 KG/M2

## 2017-05-18 DIAGNOSIS — E04.9 ENLARGED THYROID: ICD-10-CM

## 2017-05-18 DIAGNOSIS — N60.01 BREAST CYST, RIGHT: ICD-10-CM

## 2017-05-18 DIAGNOSIS — N80.9 ENDOMETRIOSIS: ICD-10-CM

## 2017-05-18 DIAGNOSIS — N97.9 FEMALE INFERTILITY: ICD-10-CM

## 2017-05-18 DIAGNOSIS — B00.1 RECURRENT COLD SORES: ICD-10-CM

## 2017-05-18 DIAGNOSIS — Z01.818 PREOP GENERAL PHYSICAL EXAM: Primary | ICD-10-CM

## 2017-05-18 DIAGNOSIS — I49.3 PVC (PREMATURE VENTRICULAR CONTRACTION): ICD-10-CM

## 2017-05-18 LAB — HEMOGLOBIN: 14 G/DL (ref 11.7–15.7)

## 2017-05-18 PROCEDURE — 85018 HEMOGLOBIN: CPT | Performed by: PHYSICIAN ASSISTANT

## 2017-05-18 PROCEDURE — 36415 COLL VENOUS BLD VENIPUNCTURE: CPT | Performed by: PHYSICIAN ASSISTANT

## 2017-05-18 PROCEDURE — 84443 ASSAY THYROID STIM HORMONE: CPT | Mod: 90 | Performed by: PHYSICIAN ASSISTANT

## 2017-05-18 PROCEDURE — 99214 OFFICE O/P EST MOD 30 MIN: CPT | Performed by: PHYSICIAN ASSISTANT

## 2017-05-18 PROCEDURE — 84439 ASSAY OF FREE THYROXINE: CPT | Mod: 90 | Performed by: PHYSICIAN ASSISTANT

## 2017-05-18 RX ORDER — SULFAMETHOXAZOLE AND TRIMETHOPRIM 400; 80 MG/1; MG/1
1 TABLET ORAL 2 TIMES DAILY
COMMUNITY
End: 2017-11-10

## 2017-05-18 NOTE — PROGRESS NOTES
[facility]  [address]  [address]  [facility] [address]  [phone]  Dept: [phone]    PRE-OP EVALUATION:  Today's date: 2017    Negar Vizcarra (: 1990) presents for pre-operative evaluation assessment as requested by Dr. Bishop.  She requires evaluation and anesthesia risk assessment prior to undergoing surgery/procedure for treatment of Tim Bunionectomy Right Foot .  Proposed procedure: Tim Bunionectomy Right Foot    Date of Surgery/ Procedure: 17  Time of Surgery/ Procedure: 12:30 P.m  Hospital/Surgical Facility: [facility]  Fax number for surgical facility: [phone]  Primary Physician: Ce Siddiqi  Type of Anesthesia Anticipated: to be determined    Patient has a Health Care Directive or Living Will:  NO    1. NO - Do you have a history of heart attack, stroke, stent, bypass or surgery on an artery in the head, neck, heart or legs?  2. NO - Do you ever have any pain or discomfort in your chest?  3. NO - Do you have a history of  Heart Failure?  4. NO - Are you troubled by shortness of breath when: walking on the level, up a slight hill or at night?  5. NO - Do you currently have a cold, bronchitis or other respiratory infection?  6. NO - Do you have a cough, shortness of breath or wheezing?  7. NO - Do you sometimes get pains in the calves of your legs when you walk?  8. NO - Do you or anyone in your family have previous history of blood clots?  9. NO - Do you or does anyone in your family have a serious bleeding problem such as prolonged bleeding following surgeries or cuts?  10. NO - Have you ever had problems with anemia or been told to take iron pills?  11. NO - Have you had any abnormal blood loss such as black, tarry or bloody stools, or abnormal vaginal bleeding?  12. NO - Have you ever had a blood transfusion?  13. YES - Have you or any of your relatives ever had problems with anesthesia? PT HAS  NAUSEA & VOMITING  14. NO - Do you have sleep apnea, excessive snoring or daytime drowsiness?  15. NO - Do you have any prosthetic heart valves?  16. NO - Do you have prosthetic joints?  17. NO - Is there any chance that you may be pregnant?       HPI:                                                      Brief HPI related to upcoming procedure: Right bunionectomy.       See problem list for active medical problems.  Problems all longstanding and stable, except as noted/documented.  See ROS for pertinent symptoms related to these conditions.                                                                                                  .    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  Ultrasound 2016       Endometriosis 02/06/2014     Priority: Medium     Stage one endometriosis noted in the pelvis.  Excision of 5mm lesion from right uterosacral ligament and cauterization of an additional 7 implants done.   Tubal dye studies with patent tubes.  Distal tubes with no adhesions or abnormalities, fimbria fine and delicate.  Upper abdomen with no adhesions. MD Rhea       ACP (advance care planning) 12/20/2013     Priority: Medium     Advance Care Planning:   ACP Review and Resources Provided:  Reviewed chart for advance care plan.  Negar Vizcarra has no plan or code status on file. Discussed available resources and provided with information. Confirmed code status reflects current choices pending further ACP discussions.  Confirmed/documented designated decision maker(s). See permanent comments section of demographics in clinical tab.   Added by Cheyenne Woody on  2/11/2014               Female infertility 12/20/2013     Priority: Medium     Health Care Home 12/20/2013     Priority: Low     State Tier Level:  Tier 1  Status:  NA  Care Coordinator:      See Letters for [facility] Care Plan            Past Medical History:   Diagnosis Date     Dysmenorrhea      Gastro-oesophageal reflux disease      PVC (premature ventricular contraction)      Past Surgical History:   Procedure Laterality Date     BUNIONECTOMY  2012    bilateral     CYSTECTOMY OVARIAN ONCOLOGY  2008    [place]     LAPAROSCOPIC ABLATION ENDOMETRIOSIS  2/6/2014    Procedure: LAPAROSCOPIC ABLATION ENDOMETRIOSIS;;  Surgeon: Daryl Jackson MD;  Location: [facility]     LAPAROSCOPIC TUBAL DYE STUDY  2/6/2014    Procedure: LAPAROSCOPIC TUBAL DYE STUDY;  LAPAROSCOPIC TUBAL DYE STUDIES, Cautery of Endometrosis, Excision Right Pelvic Endometrosis;  Surgeon: Daryl Jackson MD;  Location: [facility]     LAPAROTOMY EXPLORATORY       Current Outpatient Prescriptions   Medication Sig Dispense Refill     sulfamethoxazole-trimethoprim (BACTRIM/SEPTRA) 400-80 MG per tablet Take 1 tablet by mouth 2 times daily       Prenatal Vit-Fe Fumarate-FA (PRENATAL MULTIVITAMIN  PLUS IRON) 27-0.8 MG TABS per tablet Take 1 tablet by mouth daily 100 tablet 3     valACYclovir (VALTREX) 1000 mg tablet Take 2 tablets (2,000 mg) by mouth 2 times daily (Patient not taking: Reported on 5/18/2017) 4 tablet 11     metroNIDAZOLE (METROCREAM) 0.75 % cream Apply to face and chest BID (Patient not taking: Reported on 5/18/2017) 45 g 11     Sulfacetamide Sodium 10 % LIQD Use to wash face BID (Patient not taking: Reported on 5/18/2017) 340 mL 11     OTC products: ibuprofen 2 days ago 800 mg    Allergies   Allergen Reactions     Doxycycline Nausea and Nausea and Vomiting      Latex Allergy: NO    Social History   Substance Use Topics     Smoking status: Never Smoker     Smokeless tobacco: Never Used     Alcohol use No     History   Drug Use No       REVIEW OF  "SYSTEMS:                                                    Constitutional, neuro, ENT, endocrine, pulmonary, cardiac, gastrointestinal, genitourinary, musculoskeletal, integument and psychiatric systems are negative, except as otherwise noted.    EXAM:                                                    /80 (BP Location: Left arm, Patient Position: Chair, Cuff Size: Adult Regular)  Pulse 86  Temp 97.9  F (36.6  C) (Oral)  Ht 1.638 m (5' 4.5\")  Wt 63.7 kg (140 lb 6.4 oz)  LMP 05/16/2017  SpO2 99%  BMI 23.73 kg/m2    GENERAL APPEARANCE: healthy, alert and no distress     EYES: EOMI, PERRL     HENT: ear canals and TM's normal and nose and mouth without ulcers or lesions     NECK: no adenopathy, no asymmetry, masses, or scars.  HER THYROID IS MILDLY ENLARGED, NO PALPABLE NODULES     RESP: lungs clear to auscultation - no rales, rhonchi or wheezes     CV: regular rates and rhythm, normal S1 S2, no S3 or S4 and no murmur, click or rub     ABDOMEN:  soft, nontender, no HSM or masses and bowel sounds normal     MS: extremities normal- no gross deformities noted, no evidence of inflammation in joints, FROM in all extremities.     SKIN: no suspicious lesions or rashes     NEURO: Normal strength and tone, sensory exam grossly normal, mentation intact and speech normal     PSYCH: mentation appears normal. and affect normal/bright    DIAGNOSTICS:                                                      Labs Resulted Today:   Results for orders placed or performed in visit on 05/18/17   CL AFF HEMOGLOBIN (BFP)   Result Value Ref Range    Hemoglobin 14.0 11.7 - 15.7 g/dL       Recent Labs   Lab Test  02/03/17   1418  02/01/14   1844   HGB  13.9  13.3   PLT  277  261   NA   --   139   POTASSIUM   --   4.0   CR   --   0.75        IMPRESSION:                                                    Reason for surgery/procedure: Right bunionectomy  Diagnosis/reason for consult:  Preoperative clearance      The proposed surgical " "procedure is considered INTERMEDIATE risk.    REVISED CARDIAC RISK INDEX  The patient has the following serious cardiovascular risks for perioperative complications such as (MI, PE, VFib and 3  AV Block):  No serious cardiac risks  INTERPRETATION: 0 risks: Class I (very low risk - 0.4% complication rate)    The patient has the following additional risks for perioperative complications:  No identified additional risks      ICD-10-CM    1. Preop general physical exam Z01.818 CL AFF HEMOGLOBIN (BFP)     VENOUS COLLECTION   2. Enlarged thyroid E04.9 RADIOLOGY REFERRAL     US Head Neck Soft Tissue     TSH     T4 free   3. Endometriosis N80.9    4. Female infertility N97.9    5. Breast cyst, right N60.01    6. PVC (premature ventricular contraction) I49.3    7. Recurrent cold sores B00.1        RECOMMENDATIONS:                                                      On exam pt has enlarged thyroid - I just saw her 2 months ago and this was not present.  She states during questioning that when she was back in her home country a couple of years she \"maybe\" was told she had a nodule or thyroid problem.   Discussed risks with Dr. Arevalo who recommended workup for possible enlarged thyroid before clearing patient for surgery. TSH, T4 are pending.  Thyroid US is scheduled for Friday.       If labs and US are normal, pt will need to return for UPT 1 day prior to surgery.       Signed Electronically by: GABY Chou    Copy of this evaluation report is provided to requesting physician.    [facility] Preop Guidelines  "

## 2017-05-18 NOTE — MR AVS SNAPSHOT
After Visit Summary   5/18/2017    Negar Vizcarra    MRN: [id number]           Patient Information     Date Of Birth          1990        Visit Information        Provider Department      5/18/2017 9:30 AM Rathburn, Ce Ruthie, PA [facility]        Today's Diagnoses     Preop general physical exam    -  1    Enlarged thyroid        Endometriosis        Female infertility        Breast cyst, right        PVC (premature ventricular contraction)        Recurrent cold sores           Follow-ups after your visit        Additional Services     RADIOLOGY REFERRAL       Your provider has referred you to: [facility]: [facility] [place] [phone]   [email or web address]    Please be aware that coverage of these services is subject to the terms and limitations of your health insurance plan.  Call member services at your health plan with any benefit or coverage questions.      Please bring the following to your appointment:    >>   Any x-rays, CTs or MRIs which have been performed.  Contact the facility where they were done to arrange for  prior to your scheduled appointment.  Any new CT, MRI or other procedures ordered by your specialist must be performed at a [facility] or coordinated by your clinic's referral office.    >>   List of current medications   >>   This referral request   >>   Any documents/labs given to you for this referral    Prior authorization is required for MRI/MRA, CT, Dexa Scans and Worker's Compensation cases.                  Future tests that were ordered for you today     Open Future Orders        Priority Expected Expires Ordered    US Head Neck Soft Tissue Routine  5/18/2018 5/18/2017            Who to contact     If you have questions or need follow up information about today's clinic visit or your schedule please contact [facility] directly at [phone].  Normal or non-critical lab and imaging results  "will be communicated to you by MyChart, letter or phone within 4 business days after the clinic has received the results. If you do not hear from us within 7 days, please contact the clinic through [email or web address] or phone. If you have a critical or abnormal lab result, we will notify you by phone as soon as possible.  Submit refill requests through [email or web address] or call your pharmacy and they will forward the refill request to us. Please allow 3 business days for your refill to be completed.          Additional Information About Your Visit        [email or web address] Information     [email or web address] lets you send messages to your doctor, view your test results, renew your prescriptions, schedule appointments and more. To sign up, go to [email or web address] . Click on \"Log in\" on the left side of the screen, which will take you to the Welcome page. Then click on \"Sign up Now\" on the right side of the page.     You will be asked to enter the access code listed below, as well as some personal information. Please follow the directions to create your username and password.     Your access code is: [email or web address]  Expires: 2017 10:04 PM     Your access code will  in 90 days. If you need help or a new code, please call your [facility] clinic or [phone].        Care EveryWhere ID     This is your Care EveryWhere ID. This could be used by other organizations to access your [facility] medical records  [id number]        Your Vitals Were     Pulse Temperature Height Last Period Pulse Oximetry BMI (Body Mass Index)    86 97.9  F (36.6  C) (Oral) 1.638 m (5' 4.5\") 2017 99% 23.73 kg/m2       Blood Pressure from Last 3 Encounters:   17 120/80   17 126/84   17 118/70    Weight from Last 3 Encounters:   17 63.7 kg (140 lb 6.4 oz)   17 64.3 kg (141 lb 12.8 oz)   17 64.4 kg (142 lb)              We Performed the Following     CL AFF HEMOGLOBIN (BFP)     RADIOLOGY REFERRAL     T4 free     TSH     VENOUS COLLECTION  "       Primary Care Provider Office Phone # Fax #    GABY Chou [phone]       [facility]  [address]  [facility] [address]        Thank you!     Thank you for choosing [facility]  for your care. Our goal is always to provide you with excellent care. Hearing back from our patients is one way we can continue to improve our services. Please take a few minutes to complete the written survey that you may receive in the mail after your visit with us. Thank you!             Your Updated Medication List - Protect others around you: Learn how to safely use, store and throw away your medicines at www.disposemymeds.org.          This list is accurate as of: 5/18/17 10:04 PM.  Always use your most recent med list.                   Brand Name Dispense Instructions for use    metroNIDAZOLE 0.75 % cream    METROCREAM    45 g    Apply to face and chest BID       prenatal multivitamin  plus iron 27-0.8 MG Tabs per tablet     100 tablet    Take 1 tablet by mouth daily       Sulfacetamide Sodium 10 % Liqd     340 mL    Use to wash face BID       sulfamethoxazole-trimethoprim 400-80 MG per tablet    BACTRIM/SEPTRA     Take 1 tablet by mouth 2 times daily       valACYclovir 1000 mg tablet    VALTREX    4 tablet    Take 2 tablets (2,000 mg) by mouth 2 times daily

## 2017-05-18 NOTE — NURSING NOTE
Telephone call made to schedule Negar Vizcarra for the following: Ultrasound:Neck  Date: 5/19/17  Time: 2:15  Place: [facility].    Pt is to arrive at 2:00 pm and there is no prep at all for this US.      Pt will be taking her own  with her.   Orders have been faxed.     Cata.SOPHIE Chen ([facility])

## 2017-05-19 ENCOUNTER — TELEPHONE (OUTPATIENT)
Dept: FAMILY MEDICINE | Facility: CLINIC | Age: 27
End: 2017-05-19

## 2017-05-19 DIAGNOSIS — Z01.818 PREOP GENERAL PHYSICAL EXAM: Primary | ICD-10-CM

## 2017-05-19 LAB
T4, FREE, NON-DIALYSIS - QUEST: 1.1 NG/DL (ref 0.8–1.8)
TSH SERPL-ACNC: 1.87 MIU/L

## 2017-05-19 NOTE — TELEPHONE ENCOUNTER
[facility]   [phone]      US normal.  Labs nl    RTC day before surgery for recheck.    15 min only appt    Ce Siddiqi PA-C  5/19/2017

## 2017-06-01 ENCOUNTER — TELEPHONE (OUTPATIENT)
Dept: FAMILY MEDICINE | Facility: CLINIC | Age: 27
End: 2017-06-01

## 2017-06-01 ENCOUNTER — OFFICE VISIT (OUTPATIENT)
Dept: FAMILY MEDICINE | Facility: CLINIC | Age: 27
End: 2017-06-01

## 2017-06-01 ENCOUNTER — TRANSFERRED RECORDS (OUTPATIENT)
Dept: FAMILY MEDICINE | Facility: CLINIC | Age: 27
End: 2017-06-01

## 2017-06-01 VITALS
BODY MASS INDEX: 24.17 KG/M2 | TEMPERATURE: 98.1 F | DIASTOLIC BLOOD PRESSURE: 68 MMHG | HEIGHT: 64 IN | WEIGHT: 141.6 LBS | HEART RATE: 82 BPM | OXYGEN SATURATION: 99 % | SYSTOLIC BLOOD PRESSURE: 120 MMHG

## 2017-06-01 DIAGNOSIS — Z01.818 PREOP GENERAL PHYSICAL EXAM: Primary | ICD-10-CM

## 2017-06-01 LAB — PREG. TEST: NORMAL

## 2017-06-01 PROCEDURE — 99214 OFFICE O/P EST MOD 30 MIN: CPT | Performed by: PHYSICIAN ASSISTANT

## 2017-06-01 PROCEDURE — 81025 URINE PREGNANCY TEST: CPT | Performed by: PHYSICIAN ASSISTANT

## 2017-06-01 NOTE — H&P (VIEW-ONLY)
[facility]  [address]  [address]  [facility] [address]  [phone]  Dept: [phone]    PRE-OP EVALUATION:  Today's date: 2017    Negar Vizcarra (: 1990) presents for pre-operative evaluation assessment as requested by Dr. Bishop.  She requires evaluation and anesthesia risk assessment prior to undergoing surgery/procedure for treatment of right foot bunion  .  Proposed procedure: right foot bunionectomy    Date of Surgery/ Procedure: 2017  Time of Surgery/ Procedure: 3 PM  Hospital/Surgical Facility: [facility]  Primary Physician: Ce Siddiqi  Type of Anesthesia Anticipated: to be determined    Patient has a Health Care Directive or Living Will:  NO    1. NO - Do you have a history of heart attack, stroke, stent, bypass or surgery on an artery in the head, neck, heart or legs?  2. NO - Do you ever have any pain or discomfort in your chest?  3. NO - Do you have a history of  Heart Failure?  4. NO - Are you troubled by shortness of breath when: walking on the level, up a slight hill or at night?  5. NO - Do you currently have a cold, bronchitis or other respiratory infection?  6. NO - Do you have a cough, shortness of breath or wheezing?  7. NO - Do you sometimes get pains in the calves of your legs when you walk?  8. NO - Do you or anyone in your family have previous history of blood clots?  9. NO - Do you or does anyone in your family have a serious bleeding problem such as prolonged bleeding following surgeries or cuts?  10. NO - Have you ever had problems with anemia or been told to take iron pills?  11. NO - Have you had any abnormal blood loss such as black, tarry or bloody stools, or abnormal vaginal bleeding?  12. NO - Have you ever had a blood transfusion?  13. YES - Have you or any of your relatives ever had problems with anesthesia? PONV  14. NO - Do you have sleep apnea, excessive snoring or daytime drowsiness?  15.  NO - Do you have any prosthetic heart valves?  16. NO - Do you have prosthetic joints?  17. NO - Is there any chance that you may be pregnant?      HPI:                                                      Brief HPI related to upcoming procedure: Right bunionectomy      See problem list for active medical problems.  Problems all longstanding and stable, except as noted/documented.  See ROS for pertinent symptoms related to these conditions.                                                                                                  .    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  Ultrasound 2016       Endometriosis 02/06/2014     Priority: Medium     Stage one endometriosis noted in the pelvis.  Excision of 5mm lesion from right uterosacral ligament and cauterization of an additional 7 implants done.   Tubal dye studies with patent tubes.  Distal tubes with no adhesions or abnormalities, fimbria fine and delicate.  Upper abdomen with no adhesions. MD Rhea       ACP (advance care planning) 12/20/2013     Priority: Medium     Advance Care Planning:   ACP Review and Resources Provided:  Reviewed chart for advance care plan.  Negar Vizcarra has no plan or code status on file. Discussed available resources and provided with information. Confirmed code status reflects current choices pending further ACP discussions.  Confirmed/documented designated decision maker(s). See permanent comments section of demographics in clinical tab.   Added by Cheyenne Woody on 2/11/2014               Female infertility 12/20/2013     Priority: Medium     Health Care Home 12/20/2013      Priority: Low     State Tier Level:  Tier 1  Status:  NA  Care Coordinator:      See Letters for [facility] Care Plan            Past Medical History:   Diagnosis Date     Bunion, right foot      Dysmenorrhea      Gastro-oesophageal reflux disease      PVC (premature ventricular contraction)      Past Surgical History:   Procedure Laterality Date     BUNIONECTOMY  2012    bilateral     CYSTECTOMY OVARIAN ONCOLOGY  2008    [place]     LAPAROSCOPIC ABLATION ENDOMETRIOSIS  2/6/2014    Procedure: LAPAROSCOPIC ABLATION ENDOMETRIOSIS;;  Surgeon: Daryl Jackson MD;  Location: [facility]     LAPAROSCOPIC TUBAL DYE STUDY  2/6/2014    Procedure: LAPAROSCOPIC TUBAL DYE STUDY;  LAPAROSCOPIC TUBAL DYE STUDIES, Cautery of Endometrosis, Excision Right Pelvic Endometrosis;  Surgeon: Daryl Jackson MD;  Location: [facility]     LAPAROTOMY EXPLORATORY       Current Outpatient Prescriptions   Medication Sig Dispense Refill     Prenatal Vit-Fe Fumarate-FA (PRENATAL MULTIVITAMIN  PLUS IRON) 27-0.8 MG TABS per tablet Take 1 tablet by mouth daily 100 tablet 3     sulfamethoxazole-trimethoprim (BACTRIM/SEPTRA) 400-80 MG per tablet Take 1 tablet by mouth 2 times daily       valACYclovir (VALTREX) 1000 mg tablet Take 2 tablets (2,000 mg) by mouth 2 times daily (Patient not taking: Reported on 5/18/2017) 4 tablet 11     metroNIDAZOLE (METROCREAM) 0.75 % cream Apply to face and chest BID 45 g 11     Sulfacetamide Sodium 10 % LIQD Use to wash face BID (Patient not taking: Reported on 5/18/2017) 340 mL 11     OTC products: None, except as noted above    Allergies   Allergen Reactions     Doxycycline Nausea and Nausea and Vomiting      Latex Allergy: NO    Social History   Substance Use Topics     Smoking status: Never Smoker     Smokeless tobacco: Never Used     Alcohol use No     History   Drug Use No       REVIEW OF SYSTEMS:                                                    Constitutional, neuro, ENT, endocrine, pulmonary, cardiac,  "gastrointestinal, genitourinary, musculoskeletal, integument and psychiatric systems are negative, except as otherwise noted.    EXAM:                                                    /68 (BP Location: Right arm, Patient Position: Chair, Cuff Size: Adult Regular)  Pulse 82  Temp 98.1  F (36.7  C) (Oral)  Ht 1.619 m (5' 3.75\")  Wt 64.2 kg (141 lb 9.6 oz)  LMP 05/16/2017  SpO2 99%  Breastfeeding? No  BMI 24.5 kg/m2    GENERAL APPEARANCE: healthy, alert and no distress     EYES: EOMI, PERRL     HENT: ear canals and TM's normal and nose and mouth without ulcers or lesions     NECK: no adenopathy, no asymmetry, masses, or scars and thyroid normal to palpation     RESP: lungs clear to auscultation - no rales, rhonchi or wheezes     CV: regular rates and rhythm, normal S1 S2, no S3 or S4 and no murmur, click or rub     ABDOMEN:  soft, nontender, no HSM or masses and bowel sounds normal     MS: extremities normal- no gross deformities noted, no evidence of inflammation in joints, FROM in all extremities.     SKIN: no suspicious lesions or rashes     NEURO: Normal strength and tone, sensory exam grossly normal, mentation intact and speech normal     PSYCH: mentation appears normal. and affect normal/bright    DIAGNOSTICS:                                                      Labs Resulted Today:   Results for orders placed or performed in visit on 06/01/17   HCL Urine Pregnancy Test (BFP)   Result Value Ref Range    Pregnancy Test NEG        Hemoglobin   Date Value Ref Range Status   05/18/2017 14.0 11.7 - 15.7 g/dL Final   ]      Recent Labs   Lab Test  05/18/17   1032  02/03/17   1418  02/01/14   1844   HGB  14.0  13.9  13.3   PLT   --   277  261   NA   --    --   139   POTASSIUM   --    --   4.0   CR   --    --   0.75        IMPRESSION:                                                    Reason for surgery/procedure: right bunionectomy  Diagnosis/reason for consult:  Preoperative clearance      The proposed " surgical procedure is considered INTERMEDIATE risk.    REVISED CARDIAC RISK INDEX  The patient has the following serious cardiovascular risks for perioperative complications such as (MI, PE, VFib and 3  AV Block):  No serious cardiac risks  INTERPRETATION: 0 risks: Class I (very low risk - 0.4% complication rate)    The patient has the following additional risks for perioperative complications:  No identified additional risks      ICD-10-CM    1. Preop general physical exam Z01.818 HCL Urine Pregnancy Test (BFP)       RECOMMENDATIONS:                                                        APPROVAL GIVEN to proceed with proposed procedure, without further diagnostic evaluation       Signed Electronically by: Ce Siddiqi PA-C      Copy of this evaluation report is provided to requesting physician.    [facility] Preop Guidelines

## 2017-06-01 NOTE — MR AVS SNAPSHOT
"              After Visit Summary   6/1/2017    Negar Vizcarra    MRN: [id number]           Patient Information     Date Of Birth          1990        Visit Information        Provider Department      6/1/2017 1:45 PM Ce Siddiqi PA [facility]        Today's Diagnoses     Preop general physical exam    -  1       Follow-ups after your visit        Your next 10 appointments already scheduled     Jun 02, 2017   Procedure with Kristofer Bishop DPM   [facility] (--)    [address] [facility] [id number] [phone]              Who to contact     If you have questions or need follow up information about today's clinic visit or your schedule please contact [facility] directly at [phone].  Normal or non-critical lab and imaging results will be communicated to you by Pathagilityhart, letter or phone within 4 business days after the clinic has received the results. If you do not hear from us within 7 days, please contact the clinic through Pathagilityhart or phone. If you have a critical or abnormal lab result, we will notify you by phone as soon as possible.  Submit refill requests through Green Plug or call your pharmacy and they will forward the refill request to us. Please allow 3 business days for your refill to be completed.          Additional Information About Your Visit        MyChart Information     Green Plug lets you send messages to your doctor, view your test results, renew your prescriptions, schedule appointments and more. To sign up, go to [email or web address] . Click on \"Log in\" on the left side of the screen, which will take you to the Welcome page. Then click on \"Sign up Now\" on the right side of the page.     You will be asked to enter the access code listed below, as well as some personal information. Please follow the directions to create your username and password.     Your access code is: [email or web address]  Expires: " "2017 10:04 PM     Your access code will  in 90 days. If you need help or a new code, please call your [facility] or [phone].        Care EveryWhere ID     This is your Care EveryWhere ID. This could be used by other organizations to access your [place] medical records  [id number]        Your Vitals Were     Pulse Temperature Height Last Period Pulse Oximetry Breastfeeding?    82 98.1  F (36.7  C) (Oral) 1.619 m (5' 3.75\") 2017 99% No    BMI (Body Mass Index)                   24.5 kg/m2            Blood Pressure from Last 3 Encounters:   17 120/68   17 120/80   17 126/84    Weight from Last 3 Encounters:   17 64.2 kg (141 lb 9.6 oz)   17 63.7 kg (140 lb 6.4 oz)   17 64.3 kg (141 lb 12.8 oz)              We Performed the Following     HCL Urine Pregnancy Test (BFP)        Primary Care Provider Office Phone # Fax #    GABY Chou [phone]       [place]  [address] [facility] [facility] [address]        Thank you!     Thank you for choosing [facility]  for your care. Our goal is always to provide you with excellent care. Hearing back from our patients is one way we can continue to improve our services. Please take a few minutes to complete the written survey that you may receive in the mail after your visit with us. Thank you!             Your Updated Medication List - Protect others around you: Learn how to safely use, store and throw away your medicines at www.disposemymeds.org.          This list is accurate as of: 17  2:26 PM.  Always use your most recent med list.                   Brand Name Dispense Instructions for use    metroNIDAZOLE 0.75 % cream    METROCREAM    45 g    Apply to face and chest BID       prenatal multivitamin  plus iron 27-0.8 MG Tabs per tablet     100 tablet    Take 1 tablet by mouth daily       Sulfacetamide Sodium 10 % Liqd     340 mL    Use to " wash face BID       sulfamethoxazole-trimethoprim 400-80 MG per tablet    BACTRIM/SEPTRA     Take 1 tablet by mouth 2 times daily       valACYclovir 1000 mg tablet    VALTREX    4 tablet    Take 2 tablets (2,000 mg) by mouth 2 times daily

## 2017-06-01 NOTE — H&P (VIEW-ONLY)
[facility]  [address]  [address]  [facility] [address]  [phone]  Dept: [phone]    PRE-OP EVALUATION:  Today's date: 2017    Negar Vizcarra (: 1990) presents for pre-operative evaluation assessment as requested by Dr. Bishop.  She requires evaluation and anesthesia risk assessment prior to undergoing surgery/procedure for treatment of Tim Bunionectomy Right Foot .  Proposed procedure: Tim Bunionectomy Right Foot    Date of Surgery/ Procedure: 17  Time of Surgery/ Procedure: 12:30 P.m  Hospital/Surgical Facility: [facility]  Fax number for surgical facility: [phone]  Primary Physician: Ce Siddiqi  Type of Anesthesia Anticipated: to be determined    Patient has a Health Care Directive or Living Will:  NO    1. NO - Do you have a history of heart attack, stroke, stent, bypass or surgery on an artery in the head, neck, heart or legs?  2. NO - Do you ever have any pain or discomfort in your chest?  3. NO - Do you have a history of  Heart Failure?  4. NO - Are you troubled by shortness of breath when: walking on the level, up a slight hill or at night?  5. NO - Do you currently have a cold, bronchitis or other respiratory infection?  6. NO - Do you have a cough, shortness of breath or wheezing?  7. NO - Do you sometimes get pains in the calves of your legs when you walk?  8. NO - Do you or anyone in your family have previous history of blood clots?  9. NO - Do you or does anyone in your family have a serious bleeding problem such as prolonged bleeding following surgeries or cuts?  10. NO - Have you ever had problems with anemia or been told to take iron pills?  11. NO - Have you had any abnormal blood loss such as black, tarry or bloody stools, or abnormal vaginal bleeding?  12. NO - Have you ever had a blood transfusion?  13. YES - Have you or any of your relatives ever had problems with anesthesia? PT HAS  NAUSEA & VOMITING  14. NO - Do you have sleep apnea, excessive snoring or daytime drowsiness?  15. NO - Do you have any prosthetic heart valves?  16. NO - Do you have prosthetic joints?  17. NO - Is there any chance that you may be pregnant?       HPI:                                                      Brief HPI related to upcoming procedure: Right bunionectomy.       See problem list for active medical problems.  Problems all longstanding and stable, except as noted/documented.  See ROS for pertinent symptoms related to these conditions.                                                                                                  .    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  Ultrasound 2016       Endometriosis 02/06/2014     Priority: Medium     Stage one endometriosis noted in the pelvis.  Excision of 5mm lesion from right uterosacral ligament and cauterization of an additional 7 implants done.   Tubal dye studies with patent tubes.  Distal tubes with no adhesions or abnormalities, fimbria fine and delicate.  Upper abdomen with no adhesions. MD Rhea       ACP (advance care planning) 12/20/2013     Priority: Medium     Advance Care Planning:   ACP Review and Resources Provided:  Reviewed chart for advance care plan.  Negar Vizcarra has no plan or code status on file. Discussed available resources and provided with information. Confirmed code status reflects current choices pending further ACP discussions.  Confirmed/documented designated decision maker(s). See permanent comments section of demographics in clinical tab.   Added by Cheyenne Woody on  2/11/2014               Female infertility 12/20/2013     Priority: Medium     Health Care Home 12/20/2013     Priority: Low     State Tier Level:  Tier 1  Status:  NA  Care Coordinator:      See Letters for [facility] Care Plan            Past Medical History:   Diagnosis Date     Dysmenorrhea      Gastro-oesophageal reflux disease      PVC (premature ventricular contraction)      Past Surgical History:   Procedure Laterality Date     BUNIONECTOMY  2012    bilateral     CYSTECTOMY OVARIAN ONCOLOGY  2008    [place]     LAPAROSCOPIC ABLATION ENDOMETRIOSIS  2/6/2014    Procedure: LAPAROSCOPIC ABLATION ENDOMETRIOSIS;;  Surgeon: Daryl Jackson MD;  Location: [facility]     LAPAROSCOPIC TUBAL DYE STUDY  2/6/2014    Procedure: LAPAROSCOPIC TUBAL DYE STUDY;  LAPAROSCOPIC TUBAL DYE STUDIES, Cautery of Endometrosis, Excision Right Pelvic Endometrosis;  Surgeon: Daryl Jackson MD;  Location: [facility]     LAPAROTOMY EXPLORATORY       Current Outpatient Prescriptions   Medication Sig Dispense Refill     sulfamethoxazole-trimethoprim (BACTRIM/SEPTRA) 400-80 MG per tablet Take 1 tablet by mouth 2 times daily       Prenatal Vit-Fe Fumarate-FA (PRENATAL MULTIVITAMIN  PLUS IRON) 27-0.8 MG TABS per tablet Take 1 tablet by mouth daily 100 tablet 3     valACYclovir (VALTREX) 1000 mg tablet Take 2 tablets (2,000 mg) by mouth 2 times daily (Patient not taking: Reported on 5/18/2017) 4 tablet 11     metroNIDAZOLE (METROCREAM) 0.75 % cream Apply to face and chest BID (Patient not taking: Reported on 5/18/2017) 45 g 11     Sulfacetamide Sodium 10 % LIQD Use to wash face BID (Patient not taking: Reported on 5/18/2017) 340 mL 11     OTC products: ibuprofen 2 days ago 800 mg    Allergies   Allergen Reactions     Doxycycline Nausea and Nausea and Vomiting      Latex Allergy: NO    Social History   Substance Use Topics     Smoking status: Never Smoker     Smokeless tobacco: Never Used     Alcohol use No     History   Drug Use No       REVIEW OF  "SYSTEMS:                                                    Constitutional, neuro, ENT, endocrine, pulmonary, cardiac, gastrointestinal, genitourinary, musculoskeletal, integument and psychiatric systems are negative, except as otherwise noted.    EXAM:                                                    /80 (BP Location: Left arm, Patient Position: Chair, Cuff Size: Adult Regular)  Pulse 86  Temp 97.9  F (36.6  C) (Oral)  Ht 1.638 m (5' 4.5\")  Wt 63.7 kg (140 lb 6.4 oz)  LMP 05/16/2017  SpO2 99%  BMI 23.73 kg/m2    GENERAL APPEARANCE: healthy, alert and no distress     EYES: EOMI, PERRL     HENT: ear canals and TM's normal and nose and mouth without ulcers or lesions     NECK: no adenopathy, no asymmetry, masses, or scars.  HER THYROID IS MILDLY ENLARGED, NO PALPABLE NODULES     RESP: lungs clear to auscultation - no rales, rhonchi or wheezes     CV: regular rates and rhythm, normal S1 S2, no S3 or S4 and no murmur, click or rub     ABDOMEN:  soft, nontender, no HSM or masses and bowel sounds normal     MS: extremities normal- no gross deformities noted, no evidence of inflammation in joints, FROM in all extremities.     SKIN: no suspicious lesions or rashes     NEURO: Normal strength and tone, sensory exam grossly normal, mentation intact and speech normal     PSYCH: mentation appears normal. and affect normal/bright    DIAGNOSTICS:                                                      Labs Resulted Today:   Results for orders placed or performed in visit on 05/18/17   CL AFF HEMOGLOBIN (BFP)   Result Value Ref Range    Hemoglobin 14.0 11.7 - 15.7 g/dL       Recent Labs   Lab Test  02/03/17   1418  02/01/14   1844   HGB  13.9  13.3   PLT  277  261   NA   --   139   POTASSIUM   --   4.0   CR   --   0.75        IMPRESSION:                                                    Reason for surgery/procedure: Right bunionectomy  Diagnosis/reason for consult:  Preoperative clearance      The proposed surgical " "procedure is considered INTERMEDIATE risk.    REVISED CARDIAC RISK INDEX  The patient has the following serious cardiovascular risks for perioperative complications such as (MI, PE, VFib and 3  AV Block):  No serious cardiac risks  INTERPRETATION: 0 risks: Class I (very low risk - 0.4% complication rate)    The patient has the following additional risks for perioperative complications:  No identified additional risks      ICD-10-CM    1. Preop general physical exam Z01.818 CL AFF HEMOGLOBIN (BFP)     VENOUS COLLECTION   2. Enlarged thyroid E04.9 RADIOLOGY REFERRAL     US Head Neck Soft Tissue     TSH     T4 free   3. Endometriosis N80.9    4. Female infertility N97.9    5. Breast cyst, right N60.01    6. PVC (premature ventricular contraction) I49.3    7. Recurrent cold sores B00.1        RECOMMENDATIONS:                                                      On exam pt has enlarged thyroid - I just saw her 2 months ago and this was not present.  She states during questioning that when she was back in her home country a couple of years she \"maybe\" was told she had a nodule or thyroid problem.   Discussed risks with Dr. Arevalo who recommended workup for possible enlarged thyroid before clearing patient for surgery. TSH, T4 are pending.  Thyroid US is scheduled for Friday.       If labs and US are normal, pt will need to return for UPT 1 day prior to surgery.       Signed Electronically by: GABY Chou    Copy of this evaluation report is provided to requesting physician.    [facility] Preop Guidelines  "

## 2017-06-01 NOTE — PROGRESS NOTES
[facility]  [address]  [address]  [facility] [address]  [phone]  Dept: [phone]    PRE-OP EVALUATION:  Today's date: 2017    Negar Vizcarra (: 1990) presents for pre-operative evaluation assessment as requested by Dr. Bishop.  She requires evaluation and anesthesia risk assessment prior to undergoing surgery/procedure for treatment of right foot bunion  .  Proposed procedure: right foot bunionectomy    Date of Surgery/ Procedure: 2017  Time of Surgery/ Procedure: 3 PM  Hospital/Surgical Facility: [facility]  Primary Physician: Ce Siddiqi  Type of Anesthesia Anticipated: to be determined    Patient has a Health Care Directive or Living Will:  NO    1. NO - Do you have a history of heart attack, stroke, stent, bypass or surgery on an artery in the head, neck, heart or legs?  2. NO - Do you ever have any pain or discomfort in your chest?  3. NO - Do you have a history of  Heart Failure?  4. NO - Are you troubled by shortness of breath when: walking on the level, up a slight hill or at night?  5. NO - Do you currently have a cold, bronchitis or other respiratory infection?  6. NO - Do you have a cough, shortness of breath or wheezing?  7. NO - Do you sometimes get pains in the calves of your legs when you walk?  8. NO - Do you or anyone in your family have previous history of blood clots?  9. NO - Do you or does anyone in your family have a serious bleeding problem such as prolonged bleeding following surgeries or cuts?  10. NO - Have you ever had problems with anemia or been told to take iron pills?  11. NO - Have you had any abnormal blood loss such as black, tarry or bloody stools, or abnormal vaginal bleeding?  12. NO - Have you ever had a blood transfusion?  13. YES - Have you or any of your relatives ever had problems with anesthesia? PONV  14. NO - Do you have sleep apnea, excessive snoring or daytime drowsiness?  15.  NO - Do you have any prosthetic heart valves?  16. NO - Do you have prosthetic joints?  17. NO - Is there any chance that you may be pregnant?      HPI:                                                      Brief HPI related to upcoming procedure: Right bunionectomy      See problem list for active medical problems.  Problems all longstanding and stable, except as noted/documented.  See ROS for pertinent symptoms related to these conditions.                                                                                                  .    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  Ultrasound 2016       Endometriosis 02/06/2014     Priority: Medium     Stage one endometriosis noted in the pelvis.  Excision of 5mm lesion from right uterosacral ligament and cauterization of an additional 7 implants done.   Tubal dye studies with patent tubes.  Distal tubes with no adhesions or abnormalities, fimbria fine and delicate.  Upper abdomen with no adhesions. MD Rhea       ACP (advance care planning) 12/20/2013     Priority: Medium     Advance Care Planning:   ACP Review and Resources Provided:  Reviewed chart for advance care plan.  Negar Vizcarra has no plan or code status on file. Discussed available resources and provided with information. Confirmed code status reflects current choices pending further ACP discussions.  Confirmed/documented designated decision maker(s). See permanent comments section of demographics in clinical tab.   Added by Cheyenne Woody on 2/11/2014               Female infertility 12/20/2013     Priority: Medium     Health Care Home 12/20/2013      Priority: Low     State Tier Level:  Tier 1  Status:  NA  Care Coordinator:      See Letters for [facility] Care Plan            Past Medical History:   Diagnosis Date     Bunion, right foot      Dysmenorrhea      Gastro-oesophageal reflux disease      PVC (premature ventricular contraction)      Past Surgical History:   Procedure Laterality Date     BUNIONECTOMY  2012    bilateral     CYSTECTOMY OVARIAN ONCOLOGY  2008    [place]     LAPAROSCOPIC ABLATION ENDOMETRIOSIS  2/6/2014    Procedure: LAPAROSCOPIC ABLATION ENDOMETRIOSIS;;  Surgeon: Daryl Jackson MD;  Location: [facility]     LAPAROSCOPIC TUBAL DYE STUDY  2/6/2014    Procedure: LAPAROSCOPIC TUBAL DYE STUDY;  LAPAROSCOPIC TUBAL DYE STUDIES, Cautery of Endometrosis, Excision Right Pelvic Endometrosis;  Surgeon: Daryl Jackson MD;  Location: [facility]     LAPAROTOMY EXPLORATORY       Current Outpatient Prescriptions   Medication Sig Dispense Refill     Prenatal Vit-Fe Fumarate-FA (PRENATAL MULTIVITAMIN  PLUS IRON) 27-0.8 MG TABS per tablet Take 1 tablet by mouth daily 100 tablet 3     sulfamethoxazole-trimethoprim (BACTRIM/SEPTRA) 400-80 MG per tablet Take 1 tablet by mouth 2 times daily       valACYclovir (VALTREX) 1000 mg tablet Take 2 tablets (2,000 mg) by mouth 2 times daily (Patient not taking: Reported on 5/18/2017) 4 tablet 11     metroNIDAZOLE (METROCREAM) 0.75 % cream Apply to face and chest BID 45 g 11     Sulfacetamide Sodium 10 % LIQD Use to wash face BID (Patient not taking: Reported on 5/18/2017) 340 mL 11     OTC products: None, except as noted above    Allergies   Allergen Reactions     Doxycycline Nausea and Nausea and Vomiting      Latex Allergy: NO    Social History   Substance Use Topics     Smoking status: Never Smoker     Smokeless tobacco: Never Used     Alcohol use No     History   Drug Use No       REVIEW OF SYSTEMS:                                                    Constitutional, neuro, ENT, endocrine, pulmonary, cardiac,  "gastrointestinal, genitourinary, musculoskeletal, integument and psychiatric systems are negative, except as otherwise noted.    EXAM:                                                    /68 (BP Location: Right arm, Patient Position: Chair, Cuff Size: Adult Regular)  Pulse 82  Temp 98.1  F (36.7  C) (Oral)  Ht 1.619 m (5' 3.75\")  Wt 64.2 kg (141 lb 9.6 oz)  LMP 05/16/2017  SpO2 99%  Breastfeeding? No  BMI 24.5 kg/m2    GENERAL APPEARANCE: healthy, alert and no distress     EYES: EOMI, PERRL     HENT: ear canals and TM's normal and nose and mouth without ulcers or lesions     NECK: no adenopathy, no asymmetry, masses, or scars and thyroid normal to palpation     RESP: lungs clear to auscultation - no rales, rhonchi or wheezes     CV: regular rates and rhythm, normal S1 S2, no S3 or S4 and no murmur, click or rub     ABDOMEN:  soft, nontender, no HSM or masses and bowel sounds normal     MS: extremities normal- no gross deformities noted, no evidence of inflammation in joints, FROM in all extremities.     SKIN: no suspicious lesions or rashes     NEURO: Normal strength and tone, sensory exam grossly normal, mentation intact and speech normal     PSYCH: mentation appears normal. and affect normal/bright    DIAGNOSTICS:                                                      Labs Resulted Today:   Results for orders placed or performed in visit on 06/01/17   HCL Urine Pregnancy Test (BFP)   Result Value Ref Range    Pregnancy Test NEG        Hemoglobin   Date Value Ref Range Status   05/18/2017 14.0 11.7 - 15.7 g/dL Final   ]      Recent Labs   Lab Test  05/18/17   1032  02/03/17   1418  02/01/14   1844   HGB  14.0  13.9  13.3   PLT   --   277  261   NA   --    --   139   POTASSIUM   --    --   4.0   CR   --    --   0.75        IMPRESSION:                                                    Reason for surgery/procedure: right bunionectomy  Diagnosis/reason for consult:  Preoperative clearance      The proposed " surgical procedure is considered INTERMEDIATE risk.    REVISED CARDIAC RISK INDEX  The patient has the following serious cardiovascular risks for perioperative complications such as (MI, PE, VFib and 3  AV Block):  No serious cardiac risks  INTERPRETATION: 0 risks: Class I (very low risk - 0.4% complication rate)    The patient has the following additional risks for perioperative complications:  No identified additional risks      ICD-10-CM    1. Preop general physical exam Z01.818 HCL Urine Pregnancy Test (BFP)       RECOMMENDATIONS:                                                        APPROVAL GIVEN to proceed with proposed procedure, without further diagnostic evaluation       Signed Electronically by: Ce Siddiqi PA-C      Copy of this evaluation report is provided to requesting physician.    [facility] Preop Guidelines

## 2017-06-02 ENCOUNTER — HOSPITAL ENCOUNTER (OUTPATIENT)
Facility: CLINIC | Age: 27
Discharge: HOME | End: 2017-06-02
Attending: PODIATRIST | Admitting: PODIATRIST
Payer: COMMERCIAL

## 2017-06-02 ENCOUNTER — ANESTHESIA EVENT (OUTPATIENT)
Dept: SURGERY | Facility: CLINIC | Age: 27
End: 2017-06-02
Payer: COMMERCIAL

## 2017-06-02 ENCOUNTER — ANESTHESIA (OUTPATIENT)
Dept: SURGERY | Facility: CLINIC | Age: 27
End: 2017-06-02
Payer: COMMERCIAL

## 2017-06-02 VITALS
SYSTOLIC BLOOD PRESSURE: 113 MMHG | HEART RATE: 77 BPM | WEIGHT: 142 LBS | OXYGEN SATURATION: 99 % | RESPIRATION RATE: 14 BRPM | BODY MASS INDEX: 25.16 KG/M2 | TEMPERATURE: 97.6 F | HEIGHT: 63 IN | DIASTOLIC BLOOD PRESSURE: 65 MMHG

## 2017-06-02 DIAGNOSIS — M20.11 HALLUX VALGUS (ACQUIRED), RIGHT FOOT: Primary | ICD-10-CM

## 2017-06-02 DIAGNOSIS — M21.611 BUNION OF GREAT TOE OF RIGHT FOOT: ICD-10-CM

## 2017-06-02 LAB — HCG SERPL QL: NEGATIVE

## 2017-06-02 PROCEDURE — 25000128 H RX IP 250 OP 636: Performed by: PODIATRIST

## 2017-06-02 PROCEDURE — 36000058 ZZH SURGERY LEVEL 3 EA 15 ADDTL MIN: Performed by: PODIATRIST

## 2017-06-02 PROCEDURE — 71000027 ZZH RECOVERY PHASE 2 EACH 15 MINS: Performed by: PODIATRIST

## 2017-06-02 PROCEDURE — 84703 CHORIONIC GONADOTROPIN ASSAY: CPT | Performed by: ANESTHESIOLOGY

## 2017-06-02 PROCEDURE — C1713 ANCHOR/SCREW BN/BN,TIS/BN: HCPCS | Performed by: PODIATRIST

## 2017-06-02 PROCEDURE — 37000009 ZZH ANESTHESIA TECHNICAL FEE, EACH ADDTL 15 MIN: Performed by: PODIATRIST

## 2017-06-02 PROCEDURE — 25000128 H RX IP 250 OP 636: Performed by: NURSE ANESTHETIST, CERTIFIED REGISTERED

## 2017-06-02 PROCEDURE — 40000306 ZZH STATISTIC PRE PROC ASSESS II: Performed by: PODIATRIST

## 2017-06-02 PROCEDURE — S0020 INJECTION, BUPIVICAINE HYDRO: HCPCS | Performed by: PODIATRIST

## 2017-06-02 PROCEDURE — 37000008 ZZH ANESTHESIA TECHNICAL FEE, 1ST 30 MIN: Performed by: PODIATRIST

## 2017-06-02 PROCEDURE — 25000125 ZZHC RX 250: Performed by: NURSE ANESTHETIST, CERTIFIED REGISTERED

## 2017-06-02 PROCEDURE — 27210794 ZZH OR GENERAL SUPPLY STERILE: Performed by: PODIATRIST

## 2017-06-02 PROCEDURE — 36415 COLL VENOUS BLD VENIPUNCTURE: CPT | Performed by: ANESTHESIOLOGY

## 2017-06-02 PROCEDURE — 36000056 ZZH SURGERY LEVEL 3 1ST 30 MIN: Performed by: PODIATRIST

## 2017-06-02 PROCEDURE — 25000132 ZZH RX MED GY IP 250 OP 250 PS 637: Performed by: PODIATRIST

## 2017-06-02 PROCEDURE — 25000125 ZZHC RX 250: Performed by: PODIATRIST

## 2017-06-02 PROCEDURE — 27211024 ZZHC OR SUPPLY OTHER OPNP: Performed by: PODIATRIST

## 2017-06-02 DEVICE — IMP SCR SYN CORTEX 2.7X16MM SELF TAP SS 202.816: Type: IMPLANTABLE DEVICE | Site: FOOT | Status: FUNCTIONAL

## 2017-06-02 RX ORDER — NALOXONE HYDROCHLORIDE 0.4 MG/ML
.1-.4 INJECTION, SOLUTION INTRAMUSCULAR; INTRAVENOUS; SUBCUTANEOUS
Status: DISCONTINUED | OUTPATIENT
Start: 2017-06-02 | End: 2017-06-02 | Stop reason: HOSPADM

## 2017-06-02 RX ORDER — HYDROMORPHONE HYDROCHLORIDE 1 MG/ML
.3-.5 INJECTION, SOLUTION INTRAMUSCULAR; INTRAVENOUS; SUBCUTANEOUS EVERY 10 MIN PRN
Status: DISCONTINUED | OUTPATIENT
Start: 2017-06-02 | End: 2017-06-02 | Stop reason: HOSPADM

## 2017-06-02 RX ORDER — ONDANSETRON 4 MG/1
4 TABLET, ORALLY DISINTEGRATING ORAL EVERY 30 MIN PRN
Status: DISCONTINUED | OUTPATIENT
Start: 2017-06-02 | End: 2017-06-02 | Stop reason: HOSPADM

## 2017-06-02 RX ORDER — CEFAZOLIN SODIUM 1 G/3ML
1 INJECTION, POWDER, FOR SOLUTION INTRAMUSCULAR; INTRAVENOUS SEE ADMIN INSTRUCTIONS
Status: DISCONTINUED | OUTPATIENT
Start: 2017-06-02 | End: 2017-06-02 | Stop reason: HOSPADM

## 2017-06-02 RX ORDER — BUPIVACAINE HYDROCHLORIDE 5 MG/ML
INJECTION, SOLUTION PERINEURAL PRN
Status: DISCONTINUED | OUTPATIENT
Start: 2017-06-02 | End: 2017-06-02 | Stop reason: HOSPADM

## 2017-06-02 RX ORDER — SODIUM CHLORIDE, SODIUM LACTATE, POTASSIUM CHLORIDE, CALCIUM CHLORIDE 600; 310; 30; 20 MG/100ML; MG/100ML; MG/100ML; MG/100ML
INJECTION, SOLUTION INTRAVENOUS CONTINUOUS PRN
Status: DISCONTINUED | OUTPATIENT
Start: 2017-06-02 | End: 2017-06-02

## 2017-06-02 RX ORDER — LIDOCAINE 40 MG/G
CREAM TOPICAL
Status: DISCONTINUED | OUTPATIENT
Start: 2017-06-02 | End: 2017-06-02 | Stop reason: HOSPADM

## 2017-06-02 RX ORDER — LABETALOL HYDROCHLORIDE 5 MG/ML
10 INJECTION, SOLUTION INTRAVENOUS
Status: DISCONTINUED | OUTPATIENT
Start: 2017-06-02 | End: 2017-06-02 | Stop reason: HOSPADM

## 2017-06-02 RX ORDER — MEPERIDINE HYDROCHLORIDE 25 MG/ML
12.5 INJECTION INTRAMUSCULAR; INTRAVENOUS; SUBCUTANEOUS
Status: DISCONTINUED | OUTPATIENT
Start: 2017-06-02 | End: 2017-06-02 | Stop reason: HOSPADM

## 2017-06-02 RX ORDER — KETOROLAC TROMETHAMINE 30 MG/ML
INJECTION, SOLUTION INTRAMUSCULAR; INTRAVENOUS PRN
Status: DISCONTINUED | OUTPATIENT
Start: 2017-06-02 | End: 2017-06-02

## 2017-06-02 RX ORDER — ONDANSETRON 2 MG/ML
INJECTION INTRAMUSCULAR; INTRAVENOUS PRN
Status: DISCONTINUED | OUTPATIENT
Start: 2017-06-02 | End: 2017-06-02

## 2017-06-02 RX ORDER — SODIUM CHLORIDE, SODIUM LACTATE, POTASSIUM CHLORIDE, CALCIUM CHLORIDE 600; 310; 30; 20 MG/100ML; MG/100ML; MG/100ML; MG/100ML
INJECTION, SOLUTION INTRAVENOUS CONTINUOUS
Status: DISCONTINUED | OUTPATIENT
Start: 2017-06-02 | End: 2017-06-02 | Stop reason: HOSPADM

## 2017-06-02 RX ORDER — CEFAZOLIN SODIUM 2 G/100ML
2 INJECTION, SOLUTION INTRAVENOUS
Status: COMPLETED | OUTPATIENT
Start: 2017-06-02 | End: 2017-06-02

## 2017-06-02 RX ORDER — HYDROCODONE BITARTRATE AND ACETAMINOPHEN 5; 325 MG/1; MG/1
1-2 TABLET ORAL EVERY 4 HOURS PRN
Qty: 40 TABLET | Refills: 0 | Status: SHIPPED | OUTPATIENT
Start: 2017-06-02 | End: 2017-11-10

## 2017-06-02 RX ORDER — HYDROCODONE BITARTRATE AND ACETAMINOPHEN 5; 325 MG/1; MG/1
1-2 TABLET ORAL
Status: COMPLETED | OUTPATIENT
Start: 2017-06-02 | End: 2017-06-02

## 2017-06-02 RX ORDER — ONDANSETRON 2 MG/ML
4 INJECTION INTRAMUSCULAR; INTRAVENOUS EVERY 30 MIN PRN
Status: DISCONTINUED | OUTPATIENT
Start: 2017-06-02 | End: 2017-06-02 | Stop reason: HOSPADM

## 2017-06-02 RX ORDER — FENTANYL CITRATE 50 UG/ML
INJECTION, SOLUTION INTRAMUSCULAR; INTRAVENOUS PRN
Status: DISCONTINUED | OUTPATIENT
Start: 2017-06-02 | End: 2017-06-02

## 2017-06-02 RX ORDER — ONDANSETRON 4 MG/1
4-8 TABLET, ORALLY DISINTEGRATING ORAL EVERY 8 HOURS PRN
Qty: 20 TABLET | Refills: 0 | Status: SHIPPED | OUTPATIENT
Start: 2017-06-02 | End: 2017-11-10

## 2017-06-02 RX ORDER — PROPOFOL 10 MG/ML
INJECTION, EMULSION INTRAVENOUS PRN
Status: DISCONTINUED | OUTPATIENT
Start: 2017-06-02 | End: 2017-06-02

## 2017-06-02 RX ORDER — HYDRALAZINE HYDROCHLORIDE 20 MG/ML
2.5-5 INJECTION INTRAMUSCULAR; INTRAVENOUS EVERY 10 MIN PRN
Status: DISCONTINUED | OUTPATIENT
Start: 2017-06-02 | End: 2017-06-02 | Stop reason: HOSPADM

## 2017-06-02 RX ORDER — LIDOCAINE HYDROCHLORIDE 10 MG/ML
INJECTION, SOLUTION INFILTRATION; PERINEURAL PRN
Status: DISCONTINUED | OUTPATIENT
Start: 2017-06-02 | End: 2017-06-02

## 2017-06-02 RX ORDER — FENTANYL CITRATE 50 UG/ML
25-50 INJECTION, SOLUTION INTRAMUSCULAR; INTRAVENOUS
Status: DISCONTINUED | OUTPATIENT
Start: 2017-06-02 | End: 2017-06-02 | Stop reason: HOSPADM

## 2017-06-02 RX ORDER — PROPOFOL 10 MG/ML
INJECTION, EMULSION INTRAVENOUS CONTINUOUS PRN
Status: DISCONTINUED | OUTPATIENT
Start: 2017-06-02 | End: 2017-06-02

## 2017-06-02 RX ADMIN — PROPOFOL 50 MG: 10 INJECTION, EMULSION INTRAVENOUS at 16:31

## 2017-06-02 RX ADMIN — SODIUM CHLORIDE, POTASSIUM CHLORIDE, SODIUM LACTATE AND CALCIUM CHLORIDE: 600; 310; 30; 20 INJECTION, SOLUTION INTRAVENOUS at 17:14

## 2017-06-02 RX ADMIN — MIDAZOLAM HYDROCHLORIDE 2 MG: 1 INJECTION, SOLUTION INTRAMUSCULAR; INTRAVENOUS at 16:31

## 2017-06-02 RX ADMIN — SODIUM CHLORIDE, POTASSIUM CHLORIDE, SODIUM LACTATE AND CALCIUM CHLORIDE: 600; 310; 30; 20 INJECTION, SOLUTION INTRAVENOUS at 15:49

## 2017-06-02 RX ADMIN — FENTANYL CITRATE 25 MCG: 50 INJECTION, SOLUTION INTRAMUSCULAR; INTRAVENOUS at 16:43

## 2017-06-02 RX ADMIN — HYDROCODONE BITARTRATE AND ACETAMINOPHEN 2 TABLET: 5; 325 TABLET ORAL at 18:40

## 2017-06-02 RX ADMIN — ONDANSETRON 4 MG: 2 INJECTION INTRAMUSCULAR; INTRAVENOUS at 16:41

## 2017-06-02 RX ADMIN — MIDAZOLAM HYDROCHLORIDE 2 MG: 1 INJECTION, SOLUTION INTRAMUSCULAR; INTRAVENOUS at 16:25

## 2017-06-02 RX ADMIN — LIDOCAINE HYDROCHLORIDE 40 MG: 10 INJECTION, SOLUTION INFILTRATION; PERINEURAL at 16:31

## 2017-06-02 RX ADMIN — FENTANYL CITRATE 50 MCG: 50 INJECTION, SOLUTION INTRAMUSCULAR; INTRAVENOUS at 16:31

## 2017-06-02 RX ADMIN — KETOROLAC TROMETHAMINE 30 MG: 30 INJECTION, SOLUTION INTRAMUSCULAR at 17:06

## 2017-06-02 RX ADMIN — CEFAZOLIN SODIUM 2 G: 2 INJECTION, SOLUTION INTRAVENOUS at 16:30

## 2017-06-02 RX ADMIN — PROPOFOL 30 MG: 10 INJECTION, EMULSION INTRAVENOUS at 16:33

## 2017-06-02 RX ADMIN — PROPOFOL 200 MCG/KG/MIN: 10 INJECTION, EMULSION INTRAVENOUS at 16:31

## 2017-06-02 RX ADMIN — FENTANYL CITRATE 25 MCG: 50 INJECTION, SOLUTION INTRAMUSCULAR; INTRAVENOUS at 17:06

## 2017-06-02 NOTE — IP AVS SNAPSHOT
MRN:[id number]                      After Visit Summary   6/2/2017    Negar Vizcarra    MRN: [id number]           Thank you!     Thank you for choosing [facility] for your care. Our goal is always to provide you with excellent care. Hearing back from our patients is one way we can continue to improve our services. Please take a few minutes to complete the written survey that you may receive in the mail after you visit. If you would like to speak to someone directly about your visit please contact Patient Relations at [phone]. Thank you!          Patient Information     Date Of Birth          1990        About your hospital stay     You were admitted on:  June 2, 2017 You last received care in the:  [facility] PreOP/PostOP    You were discharged on:  June 2, 2017       Who to Call     For medical emergencies, please call [phone].  For non-urgent questions about your medical care, please call your primary care provider or clinic, [phone]  For questions related to your surgery, please call your surgery clinic        Attending Provider     Provider Kristofer Mcduffie DPM Podiatry       Primary Care Provider Office Phone # Fax #    GABY Chou [phone]      After Care Instructions     Discharge Instructions       Make appt to see Dr. Najera on Wed 6/7.    Call Dr. Najera cell, [phone], with any problems or concerns over the weekend.            Dressing       Keep dressing clean and dry.  Dressing / incisional care as instructed by RN and or Surgeon            Weight bearing status - As tolerated                 Further instructions from your care team             PODIATRIC OR ANKLE DISCHARGE INSTRUCTIONS  [facility]  DR. KRISTOFER NAJERA  [phone]    Proper care during the post-operative period is an integral part of your surgical treatment program.  It is imperative that these instructions are followed to insure  proper healing and to obtain the best results.  1.  Go directly home and keep your feet elevated on the way.  Call the office when you get home.  2.  Elevate your feet six inches above hip level by supporting feet and legs with pillows.  3.  Bedding may be kept from irritating the surgical site by use of a cardboard box to cradle the covers over feet.  4.  Apply an ice bag covered with a towel on your dressings for two hours on and one hour off for the first three days.  5.  Minor swelling is expected:  Occasionally, the skin may take on a bruised appearance.  This is not cause for alarm.  6.  Keep your bandage/cast clean and dry.  DO NOT remove the bandage or inspect the surgical site.  A small amount of blood on the bandage is normal.  7.  Cover the bandage with a plastic bag and hang the leg outside the tub while bathing.  NO SHOWERS!!  8.  Exercise your legs frequently by bending your knees to stimulate circulation and speed healing.  9.  Have prescriptions filled and take medication as directed.  If medications cause stomach upset, headache, rash or other abnormal reactions, discontinue their use   and CALL THE DOCTOR!  10.  Curtail use of alcoholic beverages and smoking.  11.  Ambulation instructions:  You may walk as necessary weight-bearing only as tolerated.  12.  You should get plenty of rest with the foot elevated, drink plenty of fluids and eat a regular well-balanced diet.  13.  If you have any problems or concerns you can call the office anytime.  There is a doctor on call 24 hours a day.  14.  Call the office to confirm or make your follow up appointment.    CALL THE OFFICE IMMEDIATELY IF:  *  The bandage becomes overly stained.  *  Your medications do not stop the discomfort.  *  You should bump or injure the surgical site.  *  You develop a fever.  *  You get your dressing wet.                  GENERAL ANESTHESIA OR SEDATION ADULT DISCHARGE INSTRUCTIONS   SPECIAL PRECAUTIONS FOR 24 HOURS AFTER  "SURGERY    IT IS NOT UNUSUAL TO FEEL LIGHT-HEADED OR FAINT, UP TO 24 HOURS AFTER SURGERY OR WHILE TAKING PAIN MEDICATION.  IF YOU HAVE THESE SYMPTOMS; SIT FOR A FEW MINUTES BEFORE STANDING AND HAVE SOMEONE ASSIST YOU WHEN YOU GET UP TO WALK OR USE THE BATHROOM.    YOU SHOULD REST AND RELAX FOR THE NEXT 24 HOURS AND YOU MUST MAKE ARRANGEMENTS TO HAVE SOMEONE STAY WITH YOU FOR AT LEAST 24 HOURS AFTER YOUR DISCHARGE.  AVOID HAZARDOUS AND STRENUOUS ACTIVITIES.  DO NOT MAKE IMPORTANT DECISIONS FOR 24 HOURS.    DO NOT DRIVE ANY VEHICLE OR OPERATE MECHANICAL EQUIPMENT FOR 24 HOURS FOLLOWING THE END OF YOUR SURGERY.  EVEN THOUGH YOU MAY FEEL NORMAL, YOUR REACTIONS MAY BE AFFECTED BY THE MEDICATION YOU HAVE RECEIVED.    DO NOT DRINK ALCOHOLIC BEVERAGES FOR 24 HOURS FOLLOWING YOUR SURGERY.    DRINK CLEAR LIQUIDS (APPLE JUICE, GINGER ALE, 7-UP, BROTH, ETC.).  PROGRESS TO YOUR REGULAR DIET AS YOU FEEL ABLE.    YOU MAY HAVE A DRY MOUTH, A SORE THROAT, MUSCLES ACHES OR TROUBLE SLEEPING.  THESE SHOULD GO AWAY AFTER 24 HOURS.    CALL YOUR DOCTOR FOR ANY OF THE FOLLOWING:  SIGNS OF INFECTION (FEVER, GROWING TENDERNESS AT THE SURGERY SITE, A LARGE AMOUNT OF DRAINAGE OR BLEEDING, SEVERE PAIN, FOUL-SMELLING DRAINAGE, REDNESS OR SWELLING.    IT HAS BEEN OVER 8 TO 10 HOURS SINCE SURGERY AND YOU ARE STILL NOT ABLE TO URINATE (PASS WATER).             You had one HYDROcodone-acetaminophen (NORCO) 5-325 MG tablet at    You received Toradol, an IV form of ibuprofen (Motrin) at 5 pm.  Do not take any ibuprofen products until 11 pm.    Pending Results     No orders found from 5/31/2017 to 6/3/2017.            Admission Information     Date & Time Provider Department Dept. Phone    6/2/2017 Kristofer Bishop DPM [facility] PreOP/PostOP [phone]      Your Vitals Were     Blood Pressure Pulse Temperature Respirations Height Weight    111/63 77 97.6  F (36.4  C) (Temporal) 14 1.6 m (5' 3\") 64.4 kg (142 lb)    Last Period Pulse Oximetry BMI " "(Body Mass Index)             2017 99% 25.15 kg/m2         [employer] Information     [employer] lets you send messages to your doctor, view your test results, renew your prescriptions, schedule appointments and more. To sign up, go to [email or web address] . Click on \"Log in\" on the left side of the screen, which will take you to the Welcome page. Then click on \"Sign up Now\" on the right side of the page.     You will be asked to enter the access code listed below, as well as some personal information. Please follow the directions to create your username and password.     Your access code is: [email or web address]  Expires: 2017 10:04 PM     Your access code will  in 90 days. If you need help or a new code, please call your [facility] clinic or [phone].        Care EveryWhere ID     This is your Care EveryWhere ID. This could be used by other organizations to access your [facility] medical records  [id number]           Review of your medicines      START taking        Dose / Directions    HYDROcodone-acetaminophen 5-325 MG per tablet   Commonly known as:  NORCO   Used for:  Hallux valgus (acquired), right foot        Dose:  1-2 tablet   Take 1-2 tablets by mouth every 4 hours as needed for other (Moderate to Severe Pain)   Quantity:  40 tablet   Refills:  0       ondansetron 4 MG ODT tab   Commonly known as:  ZOFRAN-ODT   Used for:  Bunion of great toe of right foot        Dose:  4-8 mg   Take 1-2 tablets (4-8 mg) by mouth every 8 hours as needed for nausea Dissolve ON the tongue.   Quantity:  20 tablet   Refills:  0         CONTINUE these medicines which have NOT CHANGED        Dose / Directions    metroNIDAZOLE 0.75 % cream   Commonly known as:  METROCREAM   Used for:  Acne rosacea        Apply to face and chest BID   Quantity:  45 g   Refills:  11       prenatal multivitamin  plus iron 27-0.8 MG Tabs per tablet   Used for:  Prenatal consult        Dose:  1 tablet   Take 1 tablet by mouth daily   Quantity: "  100 tablet   Refills:  3       Sulfacetamide Sodium 10 % Liqd   Used for:  Acne rosacea        Use to wash face BID   Quantity:  340 mL   Refills:  11       sulfamethoxazole-trimethoprim 400-80 MG per tablet   Commonly known as:  BACTRIM/SEPTRA        Dose:  1 tablet   Take 1 tablet by mouth 2 times daily   Refills:  0       valACYclovir 1000 mg tablet   Commonly known as:  VALTREX   Used for:  Recurrent cold sores        Dose:  2000 mg   Take 2 tablets (2,000 mg) by mouth 2 times daily   Quantity:  4 tablet   Refills:  11            Where to get your medicines      Some of these will need a paper prescription and others can be bought over the counter. Ask your nurse if you have questions.     Bring a paper prescription for each of these medications     HYDROcodone-acetaminophen 5-325 MG per tablet    ondansetron 4 MG ODT tab                Protect others around you: Learn how to safely use, store and throw away your medicines at www.disposemymeds.org.             Medication List: This is a list of all your medications and when to take them. Check marks below indicate your daily home schedule. Keep this list as a reference.      Medications           Morning Afternoon Evening Bedtime As Needed    HYDROcodone-acetaminophen 5-325 MG per tablet   Commonly known as:  NORCO   Take 1-2 tablets by mouth every 4 hours as needed for other (Moderate to Severe Pain)                                metroNIDAZOLE 0.75 % cream   Commonly known as:  METROCREAM   Apply to face and chest BID                                ondansetron 4 MG ODT tab   Commonly known as:  ZOFRAN-ODT   Take 1-2 tablets (4-8 mg) by mouth every 8 hours as needed for nausea Dissolve ON the tongue.                                prenatal multivitamin  plus iron 27-0.8 MG Tabs per tablet   Take 1 tablet by mouth daily                                Sulfacetamide Sodium 10 % Liqd   Use to wash face BID                                 sulfamethoxazole-trimethoprim 400-80 MG per tablet   Commonly known as:  BACTRIM/SEPTRA   Take 1 tablet by mouth 2 times daily                                valACYclovir 1000 mg tablet   Commonly known as:  VALTREX   Take 2 tablets (2,000 mg) by mouth 2 times daily

## 2017-06-02 NOTE — ANESTHESIA CARE TRANSFER NOTE
Patient: Negar Vizcarra    Procedure(s):  BUNIONECTOMY BAILEE-right  - Wound Class: I-Clean    Diagnosis: Hallex valgus-right  Diagnosis Additional Information: No value filed.    Anesthesia Type:   MAC     Note:  Airway :Face Mask  Patient transferred to:Phase II        Vitals: (Last set prior to Anesthesia Care Transfer)    CRNA VITALS  6/2/2017 1652 - 6/2/2017 1730      6/2/2017             SpO2: 97 %                Electronically Signed By: VANESSA Fam CRNA  June 2, 2017  5:30 PM

## 2017-06-02 NOTE — DISCHARGE INSTRUCTIONS
PODIATRIC OR ANKLE DISCHARGE INSTRUCTIONS  [facility]  DR. ZAYNAB NAJERA  [phone]    Proper care during the post-operative period is an integral part of your surgical treatment program.  It is imperative that these instructions are followed to insure proper healing and to obtain the best results.  1.  Go directly home and keep your feet elevated on the way.  Call the office when you get home.  2.  Elevate your feet six inches above hip level by supporting feet and legs with pillows.  3.  Bedding may be kept from irritating the surgical site by use of a cardboard box to cradle the covers over feet.  4.  Apply an ice bag covered with a towel on your dressings for two hours on and one hour off for the first three days.  5.  Minor swelling is expected:  Occasionally, the skin may take on a bruised appearance.  This is not cause for alarm.  6.  Keep your bandage/cast clean and dry.  DO NOT remove the bandage or inspect the surgical site.  A small amount of blood on the bandage is normal.  7.  Cover the bandage with a plastic bag and hang the leg outside the tub while bathing.  NO SHOWERS!!  8.  Exercise your legs frequently by bending your knees to stimulate circulation and speed healing.  9.  Have prescriptions filled and take medication as directed.  If medications cause stomach upset, headache, rash or other abnormal reactions, discontinue their use   and CALL THE DOCTOR!  10.  Curtail use of alcoholic beverages and smoking.  11.  Ambulation instructions:  You may walk as necessary weight-bearing only as tolerated.  12.  You should get plenty of rest with the foot elevated, drink plenty of fluids and eat a regular well-balanced diet.  13.  If you have any problems or concerns you can call the office anytime.  There is a doctor on call 24 hours a day.  14.  Call the office to confirm or make your follow up appointment.    CALL THE OFFICE IMMEDIATELY IF:  *  The bandage becomes overly  stained.  *  Your medications do not stop the discomfort.  *  You should bump or injure the surgical site.  *  You develop a fever.  *  You get your dressing wet.                  GENERAL ANESTHESIA OR SEDATION ADULT DISCHARGE INSTRUCTIONS   SPECIAL PRECAUTIONS FOR 24 HOURS AFTER SURGERY    IT IS NOT UNUSUAL TO FEEL LIGHT-HEADED OR FAINT, UP TO 24 HOURS AFTER SURGERY OR WHILE TAKING PAIN MEDICATION.  IF YOU HAVE THESE SYMPTOMS; SIT FOR A FEW MINUTES BEFORE STANDING AND HAVE SOMEONE ASSIST YOU WHEN YOU GET UP TO WALK OR USE THE BATHROOM.    YOU SHOULD REST AND RELAX FOR THE NEXT 24 HOURS AND YOU MUST MAKE ARRANGEMENTS TO HAVE SOMEONE STAY WITH YOU FOR AT LEAST 24 HOURS AFTER YOUR DISCHARGE.  AVOID HAZARDOUS AND STRENUOUS ACTIVITIES.  DO NOT MAKE IMPORTANT DECISIONS FOR 24 HOURS.    DO NOT DRIVE ANY VEHICLE OR OPERATE MECHANICAL EQUIPMENT FOR 24 HOURS FOLLOWING THE END OF YOUR SURGERY.  EVEN THOUGH YOU MAY FEEL NORMAL, YOUR REACTIONS MAY BE AFFECTED BY THE MEDICATION YOU HAVE RECEIVED.    DO NOT DRINK ALCOHOLIC BEVERAGES FOR 24 HOURS FOLLOWING YOUR SURGERY.    DRINK CLEAR LIQUIDS (APPLE JUICE, GINGER ALE, 7-UP, BROTH, ETC.).  PROGRESS TO YOUR REGULAR DIET AS YOU FEEL ABLE.    YOU MAY HAVE A DRY MOUTH, A SORE THROAT, MUSCLES ACHES OR TROUBLE SLEEPING.  THESE SHOULD GO AWAY AFTER 24 HOURS.    CALL YOUR DOCTOR FOR ANY OF THE FOLLOWING:  SIGNS OF INFECTION (FEVER, GROWING TENDERNESS AT THE SURGERY SITE, A LARGE AMOUNT OF DRAINAGE OR BLEEDING, SEVERE PAIN, FOUL-SMELLING DRAINAGE, REDNESS OR SWELLING.    IT HAS BEEN OVER 8 TO 10 HOURS SINCE SURGERY AND YOU ARE STILL NOT ABLE TO URINATE (PASS WATER).             You had one HYDROcodone-acetaminophen (NORCO) 5-325 MG tablet at    You received Toradol, an IV form of ibuprofen (Motrin) at 5 pm.  Do not take any ibuprofen products until 11 pm.

## 2017-06-02 NOTE — IP AVS SNAPSHOT
[facility] PreOP/PostOP    [address]    [facility] [address]    Phone:  [phone]    Fax:  [phone]                                       After Visit Summary   6/2/2017    Negar Vizcarra    MRN: [id number]           After Visit Summary Signature Page     I have received my discharge instructions, and my questions have been answered. I have discussed any challenges I see with this plan with the nurse or doctor.    ..........................................................................................................................................  Patient/Patient Representative Signature      ..........................................................................................................................................  Patient Representative Print Name and Relationship to Patient    ..................................................               ................................................  Date                                            Time    ..........................................................................................................................................  Reviewed by Signature/Title    ...................................................              ..............................................  Date                                                            Time

## 2017-06-02 NOTE — ANESTHESIA POSTPROCEDURE EVALUATION
Patient: Negar Vizcarra    Procedure(s):  BUNIONECTOMY BAILEE-right  - Wound Class: I-Clean    Diagnosis:Hallex valgus-right  Diagnosis Additional Information: Pre-operative diagnosis: Hallux valgus-right  Post-operative diagnosis same  Procedure: Procedure(s):  BUNIONECTOMY BAILEE-right  - Wound Class: I-Clean  Surgeon(s): Surgeon(s) and Role:     * Kristofer Bishop DPM - Primary  Estimated blood loss: * No,  values recorded between 6/2/2017  4:43 PM and 6/2/2017  5:20 PM *                   Specimens: * No specimens in log *  Findings: Bunion right         Anesthesia Type:  MAC    Note:  Anesthesia Post Evaluation    Patient location during evaluation: PACU  Patient participation: Able to fully participate in evaluation  Level of consciousness: awake  Pain management: adequate  Airway patency: patent  Cardiovascular status: acceptable  Respiratory status: acceptable  Hydration status: acceptable  PONV: none     Anesthetic complications: None          Last vitals:  Vitals:    06/02/17 1358   Pulse: 77   Resp: 18   Temp: 97.7  F (36.5  C)   SpO2: 100%         Electronically Signed By: Solomon De La Cruz MD  June 2, 2017  5:32 PM

## 2017-06-02 NOTE — BRIEF OP NOTE
[facility] Brief Operative Note    Pre-operative diagnosis: Hallux valgus-right   Post-operative diagnosis same   Procedure: Procedure(s):  BUNIONECTOMY BAILEE-right  - Wound Class: I-Clean   Surgeon(s): Surgeon(s) and Role:     * Kristofer Bishop DPM - Primary   Estimated blood loss: * No values recorded between 6/2/2017  4:43 PM and 6/2/2017  5:20 PM *    Specimens: * No specimens in log *   Findings: Bunion right

## 2017-06-03 NOTE — OR NURSING
in phase 2 through discharge.  All questions answered,  pt and  comfortable with instructions given.

## 2017-06-03 NOTE — OP NOTE
DATE OF PROCEDURE:  06/02/2017      SURGEON:  Kristofer Bishop DPM      ASSISTANT:   None.      PREOPERATIVE DIAGNOSIS:  Hallux valgus, right foot.      POSTOPERATIVE DIAGNOSIS:  Hallux valgus, right foot.      PROCEDURE:  Tim bunionectomy, right foot.      ANESTHESIA:  MAC.      DESCRIPTION OF PROCEDURE:  Negar Vizcarra presents to the hospital having been n.p.o. since midnight.  History and physical and all other preop studies were reviewed and there are no contraindications to the proposed procedure.  While in the preop holding area, the appropriate IV was instituted and the patient was taken to the OR in the supine position where additional anesthetics were administered.  This was supplemented with a total of 10 mL of 0.5% Marcaine administered in a Boss block fashion to the right foot.  The right foot was prepped aseptically and draped in the usual sterile manner.  The right lower extremity was elevated, exsanguinated and a pneumatic ankle tourniquet was inflated to 250 mmHg.  The right lower extremity was lowered to the table and sterile draping was completed.      Attention was directed to the dorsal aspect of the right first metatarsophalangeal joint, right where a 5 cm longitudinal linear incision was made just medial to the long extensor tendon.  The incision was deepened through the subcutaneous tissues, with care to clamp and cauterize all bleeders deemed necessary.  The incision was deepened through the subcutaneous tissues to the capsule of the first MPJ.  At this time, subcutaneus tissues were reflected off the capsule.  An inverted L capsular incision was made.  Capsular structures were reflected off the dorsal and medial aspects of the first metatarsal head.  The articular cartilage was inspected and there was noted to not be any the cartilaginous lesions.  There is noted to be a medial eminence at the medial first metatarsal head.  Using a sagittal saw, this medial eminence was resected and the  osseous specimen was removed from the field and placed on the back table.  Next, a V-shaped through and through osteotomy was created into the medial first metatarsal head.  The apex was made at the central aspect of the metatarsal head.  The plantar wing was created first and extended from this apex and exited proximally and plantarly just proximal to the sesamoid apparatus.  The dorsal wing was created next and extended from the apex and exited dorsally and proximally exiting near the mid diaphyseal region.  The capital fragment was then translocated laterally approximately 3 mm.  The osteotomy was temporarily fixated with a 0.45 K-wire.  The osteotomy was then permanently fixated with a 16 mm 2.7 cortical screw.  The initial temporary K-wire was removed.  The medial eminence created from capital fragment translocation was resected and this osseous specimen was removed and placed on the back table.  The osteotomy was inspected and was noted to be in good bony apposition with stable internal fixation and adequate reduction of deformity.  The wound was copiously irrigated with sterile saline.  Capsular closure was performed using 3-0 Vicryl in a simple interrupted suture fashion.  Subcutaneous tissues were reapproximated using 4-0 Vicryl in a running subcutaneous suture fashion.  Skin was coapted using 5-0 Vicryl in a running subcuticular suture fashion.      The patient tolerated the procedure and anesthesia well and taken from OR to recovery with vital signs stable and vascular status intact to the right foot.  While in recovery, the patient received oral and written postop instructions.  The patient may full weightbear right foot in a surgical shoe.  The patient was dispensed a prescription for Norco 5/325 #40 1-2 p.o. q.4h p.r.n.  Also, prescription for Zofran 4 mg #20 1 p.o. q.8h p.r.n.  The patient will follow with Dr. Bishop on 6/7/2017 for postop management.         ZAYNAB BISHOP DPM             D: 06/02/2017  17:28   T: 2017 14:56   MT: [id number]      Name:     MO MUKHERJEE   MRN:      [id number]        Account:        [id number]   :      1990           Procedure Date: 2017      Document: [id number]

## 2017-06-10 ENCOUNTER — TRANSFERRED RECORDS (OUTPATIENT)
Dept: FAMILY MEDICINE | Facility: CLINIC | Age: 27
End: 2017-06-10

## 2017-11-10 ENCOUNTER — OFFICE VISIT (OUTPATIENT)
Dept: FAMILY MEDICINE | Facility: CLINIC | Age: 27
End: 2017-11-10

## 2017-11-10 VITALS
SYSTOLIC BLOOD PRESSURE: 124 MMHG | HEART RATE: 68 BPM | TEMPERATURE: 97.6 F | DIASTOLIC BLOOD PRESSURE: 80 MMHG | BODY MASS INDEX: 24.45 KG/M2 | OXYGEN SATURATION: 99 % | WEIGHT: 138 LBS

## 2017-11-10 DIAGNOSIS — J02.9 VIRAL PHARYNGITIS: Primary | ICD-10-CM

## 2017-11-10 LAB — S PYO AG THROAT QL IA.RAPID: NORMAL

## 2017-11-10 PROCEDURE — 87070 CULTURE OTHR SPECIMN AEROBIC: CPT | Performed by: PHYSICIAN ASSISTANT

## 2017-11-10 PROCEDURE — 99213 OFFICE O/P EST LOW 20 MIN: CPT | Performed by: PHYSICIAN ASSISTANT

## 2017-11-10 PROCEDURE — 87880 STREP A ASSAY W/OPTIC: CPT | Performed by: PHYSICIAN ASSISTANT

## 2017-11-10 NOTE — MR AVS SNAPSHOT
"              After Visit Summary   11/10/2017    Negar Vizcarra    MRN: [id number]           Patient Information     Date Of Birth          1990        Visit Information        Provider Department      11/10/2017 10:00 AM Fabiola Vidales PA-C [facility]        Today's Diagnoses     Viral pharyngitis    -  1       Follow-ups after your visit        Follow-up notes from your care team     Return if symptoms worsen or fail to improve.      Who to contact     If you have questions or need follow up information about today's clinic visit or your schedule please contact [facility] directly at [phone].  Normal or non-critical lab and imaging results will be communicated to you by MyChart, letter or phone within 4 business days after the clinic has received the results. If you do not hear from us within 7 days, please contact the clinic through MyChart or phone. If you have a critical or abnormal lab result, we will notify you by phone as soon as possible.  Submit refill requests through [employer] or call your pharmacy and they will forward the refill request to us. Please allow 3 business days for your refill to be completed.          Additional Information About Your Visit        MyChart Information     [employer] lets you send messages to your doctor, view your test results, renew your prescriptions, schedule appointments and more. To sign up, go to [email or web address] . Click on \"Log in\" on the left side of the screen, which will take you to the Welcome page. Then click on \"Sign up Now\" on the right side of the page.     You will be asked to enter the access code listed below, as well as some personal information. Please follow the directions to create your username and password.     Your access code is: [email or web address]  Expires: 2018  5:11 PM     Your access code will  in 90 days. If you need help or a new code, please call your [place] clinic or " [phone].        Care EveryWhere ID     This is your Care EveryWhere ID. This could be used by other organizations to access your [place] medical records  [id number]        Your Vitals Were     Pulse Temperature Last Period Pulse Oximetry Breastfeeding? BMI (Body Mass Index)    68 97.6  F (36.4  C) (Oral) 11/09/2017 99% No 24.45 kg/m2       Blood Pressure from Last 3 Encounters:   11/10/17 124/80   06/02/17 113/65   06/01/17 120/68    Weight from Last 3 Encounters:   11/10/17 62.6 kg (138 lb)   06/02/17 64.4 kg (142 lb)   06/01/17 64.2 kg (141 lb 9.6 oz)              We Performed the Following     RAPID STREP (BFP)     THROAT CULTURE (BFP)        Primary Care Provider Office Phone # Fax #    GABY Chou [phone] [address]  [facility] [address]        Equal Access to Services     [facility] Hospital[facility] : Hadii riya ku hadasho Soomaali, waaxda luqadaha, qaybta kaalmada adeegyada, waxay familiain hayjeannette snyder . So [facility] [phone].    ATENCIÓN: Si habla español, tiene a alonzo disposición servicios gratuitos de asistencia lingüística. Llame al [phone].    We comply with applicable federal civil rights laws and [place] laws. We do not discriminate on the basis of race, color, national origin, age, disability, sex, sexual orientation, or gender identity.            Thank you!     Thank you for choosing [facility]  for your care. Our goal is always to provide you with excellent care. Hearing back from our patients is one way we can continue to improve our services. Please take a few minutes to complete the written survey that you may receive in the mail after your visit with us. Thank you!             Your Updated Medication List - Protect others around you: Learn how to safely use, store and throw away your medicines at www.disposemymeds.org.          This list is accurate as of: 11/10/17  5:11 PM.  Always use your most recent med list.                    Brand Name Dispense Instructions for use Diagnosis    prenatal multivitamin plus iron 27-0.8 MG Tabs per tablet     100 tablet    Take 1 tablet by mouth daily    Prenatal consult

## 2017-11-10 NOTE — PROGRESS NOTES
CC: Sore throat    History:  Negar started noticing painful throat and swollen glands, and some body aches 4 days ago. Towards the night and in morning is worst. Has been taking ibuprofen 600 mg yesterday morning, and last night took 400 mg. This gives her some relief. No fever, sweats, but is getting chills. Is still able to swallow fluids, and is eating relatively normal diet.     No history of strep throat. No known exposure.       PMH, MEDICATIONS, ALLERGIES, SOCIAL AND FAMILY HISTORY in [facility] and reviewed by me personally.      ROS negative other than the symptoms noted above in the HPI.        Examination   /80 (BP Location: Left arm, Patient Position: Chair, Cuff Size: Adult Regular)  Pulse 68  Temp 97.6  F (36.4  C) (Oral)  Wt 62.6 kg (138 lb)  LMP 11/09/2017  SpO2 99%  Breastfeeding? No  BMI 24.45 kg/m2       Constitutional: Sitting comfortably, in no acute distress. Vital signs noted  Eyes: pupils equal round reactive to light and accomodation, extra ocular movements intact  Ears: external canals and TMs free of abnormalities  Nose: patent, without mucosal abnormalities  Mouth and throat: without erythema or lesions of the mucosa  Neck:  no adenopathy, trachea midline and normal to palpation  Cardiovascular:  regular rate and rhythm, no murmurs, clicks, or gallops  Respiratory:  normal respiratory rate and rhythm, lungs clear to auscultation  SKIN: No jaundice/pallor/rash.   Psychiatric: mentation appears normal and affect normal/bright        A/P    ICD-10-CM    1. Viral pharyngitis J02.9 RAPID STREP (BFP)     THROAT CULTURE (BFP)       DISCUSSION: RST was negative. Will culture to confirm, and contact next week if possible. Recommended salt water gurgles, and alternating ibuprofen (with food) and Tylenol every 3 hours. She should contact me if symptom worsen.      follow up visit: As needed    Fabiola Vidales PA-C  [facility]

## 2017-11-10 NOTE — NURSING NOTE
Negar is here for throat pain that began X3 days ago. Pt hasnt had a fever    Pre-Visit Screening :  Immunizations : not up to date - postponed flu  Colonoscopy : is up to date  Mammogram : is up to date  Asthma Action Test/Plan : MAKENNA  PHQ9/GAD7 :  NA  Pulse - regular  My Chart - declines    CLASSIFICATION OF OVERWEIGHT AND OBESITY BY BMI                         Obesity Class           BMI(kg/m2)  Underweight                                    < 18.5  Normal                                         18.5-24.9  Overweight                                     25.0-29.9  OBESITY                     I                  30.0-34.9                              II                 35.0-39.9  EXTREME OBESITY             III                >40                             Patient's  BMI Body mass index is 24.45 kg/(m^2).  http://hin.nhlbi.nih.gov/menuplanner/menu.cgi  Questioned patient about current smoking habits.  Pt. has never smoked.

## 2017-11-13 LAB — THROAT CULTURE: NORMAL

## 2017-12-03 ENCOUNTER — HEALTH MAINTENANCE LETTER (OUTPATIENT)
Age: 27
End: 2017-12-03

## 2017-12-05 ENCOUNTER — TRANSFERRED RECORDS (OUTPATIENT)
Dept: FAMILY MEDICINE | Facility: CLINIC | Age: 27
End: 2017-12-05

## 2018-04-25 DIAGNOSIS — B00.1 RECURRENT COLD SORES: ICD-10-CM

## 2018-04-25 DIAGNOSIS — Z71.89 PRENATAL CONSULT: ICD-10-CM

## 2018-04-25 RX ORDER — VALACYCLOVIR HYDROCHLORIDE 1 G/1
TABLET, FILM COATED ORAL
Qty: 4 TABLET | Refills: 3 | Status: SHIPPED | OUTPATIENT
Start: 2018-04-25 | End: 2020-10-01

## 2018-04-25 RX ORDER — PRENATAL VIT/IRON FUM/FOLIC AC 27MG-0.8MG
TABLET ORAL
Qty: 100 TABLET | Refills: 3 | Status: SHIPPED | OUTPATIENT
Start: 2018-04-25 | End: 2020-10-01

## 2018-04-25 NOTE — TELEPHONE ENCOUNTER
Pending Prescriptions:                       Disp   Refills    Prenatal Vit-Fe Fumarate-FA (PRENATAL MUL*100 ta*             Sig: TAKE ONE TABLET BY MOUTH DAILY    valACYclovir (VALTREX) 1000 mg tablet [Ph*4 tabl*             Sig: TAKE 2 TABLETS(2000 MG) BY MOUTH TWICE DAILY    CER Please review:    The prenantal was from 2-2017  The valtrex was d/c'd 11-  Last ov was 11-  Please fax or deny meds or  Send to FD for ov let them know if pt should be fasting or not  Radhaes  [phone] (home) NONE (work)

## 2018-04-25 NOTE — LETTER
[facility]  [facility] [address]  [place]  [address]    April 30, 2018        Negar Vizcarra  [address]   [facility] [address]              Dear Negar Vizcarra    I sent in your refills on your medications.  We need to see you once a year for your medication check - please schedule this, come in fasting    I think you see your OBGYN yearly correct?  If not please schedule a complete fasting physical exam with me.       If you have any further questions or problems, please contact our office at [phone].          Ce Siddiqi PA-C

## 2018-04-25 NOTE — TELEPHONE ENCOUNTER
Please call pt   I did sent in refills on her medications but she needs to come in for a fasting med check prior to any refills.    If she doesn't see OBGYN (I think she does) then schedule a complete fasting physical.    If not then just schedule 15 min non-fasting OV.    Ce Siddiqi PA-C  4/25/2018

## 2018-04-25 NOTE — TELEPHONE ENCOUNTER
Tried calling home and cell, numbers are not for patient.      Tried reaching [employer] interpreters, unable to leave message.     CER send letter out to patient?

## 2018-05-23 RX ORDER — NORETHINDRONE ACETATE 5 MG
5 TABLET ORAL DAILY
COMMUNITY
End: 2019-12-02

## 2018-05-24 ENCOUNTER — SURGERY (OUTPATIENT)
Age: 28
End: 2018-05-24

## 2018-05-24 ENCOUNTER — HOSPITAL ENCOUNTER (OUTPATIENT)
Facility: CLINIC | Age: 28
Discharge: HOME | End: 2018-05-24
Attending: OBSTETRICS & GYNECOLOGY | Admitting: OBSTETRICS & GYNECOLOGY
Payer: COMMERCIAL

## 2018-05-24 ENCOUNTER — ANESTHESIA EVENT (OUTPATIENT)
Dept: SURGERY | Facility: CLINIC | Age: 28
End: 2018-05-24
Payer: COMMERCIAL

## 2018-05-24 ENCOUNTER — ANESTHESIA (OUTPATIENT)
Dept: SURGERY | Facility: CLINIC | Age: 28
End: 2018-05-24
Payer: COMMERCIAL

## 2018-05-24 VITALS
OXYGEN SATURATION: 98 % | DIASTOLIC BLOOD PRESSURE: 75 MMHG | SYSTOLIC BLOOD PRESSURE: 116 MMHG | TEMPERATURE: 98.4 F | RESPIRATION RATE: 24 BRPM | BODY MASS INDEX: 23 KG/M2 | HEIGHT: 64 IN | WEIGHT: 134.7 LBS

## 2018-05-24 DIAGNOSIS — K59.03 DRUG-INDUCED CONSTIPATION: ICD-10-CM

## 2018-05-24 DIAGNOSIS — R11.0 NAUSEA: ICD-10-CM

## 2018-05-24 DIAGNOSIS — R10.2 PELVIC PAIN: Primary | ICD-10-CM

## 2018-05-24 LAB — B-HCG SERPL-ACNC: <1 IU/L (ref 0–5)

## 2018-05-24 PROCEDURE — 36000056 ZZH SURGERY LEVEL 3 1ST 30 MIN: Performed by: OBSTETRICS & GYNECOLOGY

## 2018-05-24 PROCEDURE — 27210995 ZZH RX 272: Performed by: OBSTETRICS & GYNECOLOGY

## 2018-05-24 PROCEDURE — 71000027 ZZH RECOVERY PHASE 2 EACH 15 MINS: Performed by: OBSTETRICS & GYNECOLOGY

## 2018-05-24 PROCEDURE — 25000566 ZZH SEVOFLURANE, EA 15 MIN: Performed by: OBSTETRICS & GYNECOLOGY

## 2018-05-24 PROCEDURE — 37000008 ZZH ANESTHESIA TECHNICAL FEE, 1ST 30 MIN: Performed by: OBSTETRICS & GYNECOLOGY

## 2018-05-24 PROCEDURE — 25000128 H RX IP 250 OP 636: Performed by: NURSE ANESTHETIST, CERTIFIED REGISTERED

## 2018-05-24 PROCEDURE — 25000125 ZZHC RX 250: Performed by: OBSTETRICS & GYNECOLOGY

## 2018-05-24 PROCEDURE — 27210794 ZZH OR GENERAL SUPPLY STERILE: Performed by: OBSTETRICS & GYNECOLOGY

## 2018-05-24 PROCEDURE — 36000058 ZZH SURGERY LEVEL 3 EA 15 ADDTL MIN: Performed by: OBSTETRICS & GYNECOLOGY

## 2018-05-24 PROCEDURE — 25000128 H RX IP 250 OP 636: Performed by: OBSTETRICS & GYNECOLOGY

## 2018-05-24 PROCEDURE — 71000012 ZZH RECOVERY PHASE 1 LEVEL 1 FIRST HR: Performed by: OBSTETRICS & GYNECOLOGY

## 2018-05-24 PROCEDURE — 25000128 H RX IP 250 OP 636: Performed by: ANESTHESIOLOGY

## 2018-05-24 PROCEDURE — 25800025 ZZH RX 258: Performed by: OBSTETRICS & GYNECOLOGY

## 2018-05-24 PROCEDURE — 25000132 ZZH RX MED GY IP 250 OP 250 PS 637: Performed by: OBSTETRICS & GYNECOLOGY

## 2018-05-24 PROCEDURE — 71000013 ZZH RECOVERY PHASE 1 LEVEL 1 EA ADDTL HR: Performed by: OBSTETRICS & GYNECOLOGY

## 2018-05-24 PROCEDURE — 25000125 ZZHC RX 250: Performed by: NURSE ANESTHETIST, CERTIFIED REGISTERED

## 2018-05-24 PROCEDURE — 40000170 ZZH STATISTIC PRE-PROCEDURE ASSESSMENT II: Performed by: OBSTETRICS & GYNECOLOGY

## 2018-05-24 PROCEDURE — 37000009 ZZH ANESTHESIA TECHNICAL FEE, EACH ADDTL 15 MIN: Performed by: OBSTETRICS & GYNECOLOGY

## 2018-05-24 PROCEDURE — 36415 COLL VENOUS BLD VENIPUNCTURE: CPT | Performed by: OBSTETRICS & GYNECOLOGY

## 2018-05-24 PROCEDURE — 84702 CHORIONIC GONADOTROPIN TEST: CPT | Performed by: OBSTETRICS & GYNECOLOGY

## 2018-05-24 RX ORDER — OXYCODONE HYDROCHLORIDE 5 MG/1
5 TABLET ORAL
Status: DISCONTINUED | OUTPATIENT
Start: 2018-05-24 | End: 2018-05-24 | Stop reason: HOSPADM

## 2018-05-24 RX ORDER — NALOXONE HYDROCHLORIDE 0.4 MG/ML
.1-.4 INJECTION, SOLUTION INTRAMUSCULAR; INTRAVENOUS; SUBCUTANEOUS
Status: DISCONTINUED | OUTPATIENT
Start: 2018-05-24 | End: 2018-05-24 | Stop reason: HOSPADM

## 2018-05-24 RX ORDER — PROPOFOL 10 MG/ML
INJECTION, EMULSION INTRAVENOUS CONTINUOUS PRN
Status: DISCONTINUED | OUTPATIENT
Start: 2018-05-24 | End: 2018-05-24

## 2018-05-24 RX ORDER — OXYCODONE HYDROCHLORIDE 5 MG/1
5-10 TABLET ORAL
Qty: 12 TABLET | Refills: 0 | Status: SHIPPED | OUTPATIENT
Start: 2018-05-24 | End: 2019-12-02

## 2018-05-24 RX ORDER — HYDROXYZINE HYDROCHLORIDE 25 MG/1
25 TABLET, FILM COATED ORAL
Status: DISCONTINUED | OUTPATIENT
Start: 2018-05-24 | End: 2018-05-24 | Stop reason: HOSPADM

## 2018-05-24 RX ORDER — FENTANYL CITRATE 50 UG/ML
INJECTION, SOLUTION INTRAMUSCULAR; INTRAVENOUS PRN
Status: DISCONTINUED | OUTPATIENT
Start: 2018-05-24 | End: 2018-05-24

## 2018-05-24 RX ORDER — BUPIVACAINE HYDROCHLORIDE 2.5 MG/ML
INJECTION, SOLUTION INFILTRATION; PERINEURAL PRN
Status: DISCONTINUED | OUTPATIENT
Start: 2018-05-24 | End: 2018-05-24 | Stop reason: HOSPADM

## 2018-05-24 RX ORDER — ONDANSETRON 2 MG/ML
4 INJECTION INTRAMUSCULAR; INTRAVENOUS EVERY 30 MIN PRN
Status: DISCONTINUED | OUTPATIENT
Start: 2018-05-24 | End: 2018-05-24 | Stop reason: HOSPADM

## 2018-05-24 RX ORDER — ACETAMINOPHEN 325 MG/1
975 TABLET ORAL ONCE
Status: COMPLETED | OUTPATIENT
Start: 2018-05-24 | End: 2018-05-24

## 2018-05-24 RX ORDER — NEOSTIGMINE METHYLSULFATE 1 MG/ML
VIAL (ML) INJECTION PRN
Status: DISCONTINUED | OUTPATIENT
Start: 2018-05-24 | End: 2018-05-24

## 2018-05-24 RX ORDER — ONDANSETRON 4 MG/1
4-8 TABLET, ORALLY DISINTEGRATING ORAL EVERY 8 HOURS PRN
Qty: 30 TABLET | Refills: 0 | Status: SHIPPED | OUTPATIENT
Start: 2018-05-24 | End: 2019-12-02

## 2018-05-24 RX ORDER — CEFAZOLIN SODIUM 2 G/100ML
2 INJECTION, SOLUTION INTRAVENOUS
Status: COMPLETED | OUTPATIENT
Start: 2018-05-24 | End: 2018-05-24

## 2018-05-24 RX ORDER — CEFAZOLIN SODIUM 1 G/3ML
1 INJECTION, POWDER, FOR SOLUTION INTRAMUSCULAR; INTRAVENOUS SEE ADMIN INSTRUCTIONS
Status: DISCONTINUED | OUTPATIENT
Start: 2018-05-24 | End: 2018-05-24 | Stop reason: HOSPADM

## 2018-05-24 RX ORDER — SODIUM CHLORIDE, SODIUM LACTATE, POTASSIUM CHLORIDE, CALCIUM CHLORIDE 600; 310; 30; 20 MG/100ML; MG/100ML; MG/100ML; MG/100ML
INJECTION, SOLUTION INTRAVENOUS CONTINUOUS PRN
Status: DISCONTINUED | OUTPATIENT
Start: 2018-05-24 | End: 2018-05-24

## 2018-05-24 RX ORDER — FENTANYL CITRATE 50 UG/ML
25-50 INJECTION, SOLUTION INTRAMUSCULAR; INTRAVENOUS
Status: DISCONTINUED | OUTPATIENT
Start: 2018-05-24 | End: 2018-05-24 | Stop reason: HOSPADM

## 2018-05-24 RX ORDER — MEPERIDINE HYDROCHLORIDE 25 MG/ML
12.5 INJECTION INTRAMUSCULAR; INTRAVENOUS; SUBCUTANEOUS
Status: DISCONTINUED | OUTPATIENT
Start: 2018-05-24 | End: 2018-05-24 | Stop reason: HOSPADM

## 2018-05-24 RX ORDER — HYDROMORPHONE HYDROCHLORIDE 1 MG/ML
.3-.5 INJECTION, SOLUTION INTRAMUSCULAR; INTRAVENOUS; SUBCUTANEOUS EVERY 10 MIN PRN
Status: DISCONTINUED | OUTPATIENT
Start: 2018-05-24 | End: 2018-05-24 | Stop reason: HOSPADM

## 2018-05-24 RX ORDER — AMOXICILLIN 250 MG
1-2 CAPSULE ORAL 2 TIMES DAILY
Qty: 30 TABLET | Refills: 0 | Status: SHIPPED | OUTPATIENT
Start: 2018-05-24 | End: 2020-10-01

## 2018-05-24 RX ORDER — GLYCOPYRROLATE 0.2 MG/ML
INJECTION, SOLUTION INTRAMUSCULAR; INTRAVENOUS PRN
Status: DISCONTINUED | OUTPATIENT
Start: 2018-05-24 | End: 2018-05-24

## 2018-05-24 RX ORDER — ACETAMINOPHEN 325 MG/1
650 TABLET ORAL EVERY 4 HOURS PRN
Qty: 100 TABLET | Refills: 0 | Status: SHIPPED | OUTPATIENT
Start: 2018-05-24

## 2018-05-24 RX ORDER — IBUPROFEN 600 MG/1
600 TABLET, FILM COATED ORAL EVERY 6 HOURS PRN
Qty: 30 TABLET | Refills: 0 | Status: SHIPPED | OUTPATIENT
Start: 2018-05-24

## 2018-05-24 RX ORDER — ACETAMINOPHEN 325 MG/1
650 TABLET ORAL
Status: DISCONTINUED | OUTPATIENT
Start: 2018-05-24 | End: 2018-05-24 | Stop reason: HOSPADM

## 2018-05-24 RX ORDER — PROPOFOL 10 MG/ML
INJECTION, EMULSION INTRAVENOUS PRN
Status: DISCONTINUED | OUTPATIENT
Start: 2018-05-24 | End: 2018-05-24

## 2018-05-24 RX ORDER — ONDANSETRON 4 MG/1
4 TABLET, ORALLY DISINTEGRATING ORAL
Status: DISCONTINUED | OUTPATIENT
Start: 2018-05-24 | End: 2018-05-24 | Stop reason: HOSPADM

## 2018-05-24 RX ORDER — SODIUM CHLORIDE, SODIUM LACTATE, POTASSIUM CHLORIDE, CALCIUM CHLORIDE 600; 310; 30; 20 MG/100ML; MG/100ML; MG/100ML; MG/100ML
INJECTION, SOLUTION INTRAVENOUS CONTINUOUS
Status: DISCONTINUED | OUTPATIENT
Start: 2018-05-24 | End: 2018-05-24 | Stop reason: HOSPADM

## 2018-05-24 RX ORDER — ONDANSETRON 2 MG/ML
INJECTION INTRAMUSCULAR; INTRAVENOUS PRN
Status: DISCONTINUED | OUTPATIENT
Start: 2018-05-24 | End: 2018-05-24

## 2018-05-24 RX ORDER — LIDOCAINE HYDROCHLORIDE 20 MG/ML
INJECTION, SOLUTION INFILTRATION; PERINEURAL PRN
Status: DISCONTINUED | OUTPATIENT
Start: 2018-05-24 | End: 2018-05-24

## 2018-05-24 RX ORDER — ONDANSETRON 4 MG/1
4 TABLET, ORALLY DISINTEGRATING ORAL EVERY 30 MIN PRN
Status: DISCONTINUED | OUTPATIENT
Start: 2018-05-24 | End: 2018-05-24 | Stop reason: HOSPADM

## 2018-05-24 RX ORDER — DEXAMETHASONE SODIUM PHOSPHATE 4 MG/ML
INJECTION, SOLUTION INTRA-ARTICULAR; INTRALESIONAL; INTRAMUSCULAR; INTRAVENOUS; SOFT TISSUE PRN
Status: DISCONTINUED | OUTPATIENT
Start: 2018-05-24 | End: 2018-05-24

## 2018-05-24 RX ADMIN — FENTANYL CITRATE 50 MCG: 50 INJECTION, SOLUTION INTRAMUSCULAR; INTRAVENOUS at 15:13

## 2018-05-24 RX ADMIN — BUPIVACAINE HYDROCHLORIDE 5 ML: 2.5 INJECTION, SOLUTION EPIDURAL; INFILTRATION; INTRACAUDAL; PERINEURAL at 13:59

## 2018-05-24 RX ADMIN — PROPOFOL 150 MG: 10 INJECTION, EMULSION INTRAVENOUS at 13:15

## 2018-05-24 RX ADMIN — NEOSTIGMINE METHYLSULFATE 3 MG: 1 INJECTION, SOLUTION INTRAVENOUS at 14:02

## 2018-05-24 RX ADMIN — FENTANYL CITRATE 100 MCG: 50 INJECTION, SOLUTION INTRAMUSCULAR; INTRAVENOUS at 13:15

## 2018-05-24 RX ADMIN — ACETAMINOPHEN 975 MG: 325 TABLET ORAL at 12:14

## 2018-05-24 RX ADMIN — GLYCOPYRROLATE 0.4 MG: 0.2 INJECTION, SOLUTION INTRAMUSCULAR; INTRAVENOUS at 14:02

## 2018-05-24 RX ADMIN — METHYLENE BLUE 5 ML: 10 INJECTION INTRAVENOUS at 13:54

## 2018-05-24 RX ADMIN — ONDANSETRON 4 MG: 2 INJECTION INTRAMUSCULAR; INTRAVENOUS at 13:23

## 2018-05-24 RX ADMIN — SODIUM CHLORIDE, POTASSIUM CHLORIDE, SODIUM LACTATE AND CALCIUM CHLORIDE: 600; 310; 30; 20 INJECTION, SOLUTION INTRAVENOUS at 13:12

## 2018-05-24 RX ADMIN — LIDOCAINE HYDROCHLORIDE 60 MG: 20 INJECTION, SOLUTION INFILTRATION; PERINEURAL at 13:15

## 2018-05-24 RX ADMIN — PROPOFOL 50 MG: 10 INJECTION, EMULSION INTRAVENOUS at 13:28

## 2018-05-24 RX ADMIN — DEXAMETHASONE SODIUM PHOSPHATE 8 MG: 4 INJECTION, SOLUTION INTRA-ARTICULAR; INTRALESIONAL; INTRAMUSCULAR; INTRAVENOUS; SOFT TISSUE at 13:23

## 2018-05-24 RX ADMIN — PROPOFOL 200 MCG/KG/MIN: 10 INJECTION, EMULSION INTRAVENOUS at 13:15

## 2018-05-24 RX ADMIN — ROCURONIUM BROMIDE 40 MG: 10 INJECTION INTRAVENOUS at 13:15

## 2018-05-24 RX ADMIN — CEFAZOLIN SODIUM 2 G: 2 INJECTION, SOLUTION INTRAVENOUS at 13:20

## 2018-05-24 RX ADMIN — SODIUM CHLORIDE 500 ML: 900 IRRIGANT IRRIGATION at 13:54

## 2018-05-24 RX ADMIN — FENTANYL CITRATE 50 MCG: 50 INJECTION, SOLUTION INTRAMUSCULAR; INTRAVENOUS at 13:31

## 2018-05-24 RX ADMIN — MIDAZOLAM 2 MG: 1 INJECTION INTRAMUSCULAR; INTRAVENOUS at 13:13

## 2018-05-24 RX ADMIN — SODIUM CHLORIDE, POTASSIUM CHLORIDE, SODIUM LACTATE AND CALCIUM CHLORIDE: 600; 310; 30; 20 INJECTION, SOLUTION INTRAVENOUS at 14:03

## 2018-05-24 RX ADMIN — Medication 1 APPLICATOR: at 13:54

## 2018-05-24 ASSESSMENT — ENCOUNTER SYMPTOMS
DYSRHYTHMIAS: 1
SEIZURES: 0

## 2018-05-24 ASSESSMENT — LIFESTYLE VARIABLES: TOBACCO_USE: 0

## 2018-05-24 NOTE — ANESTHESIA CARE TRANSFER NOTE
Patient: Negar Vizcarra    Procedure(s):  DIAGNOSTIC LAPAROSCOPY, LAPAROSCOPIC BILATERAL TUBAL DYE STUDIES, CAUTERIZATION OF ENDOMETRIOSIS( - Wound Class: I-Clean   - Wound Class: II-Clean Contaminated    Diagnosis: ENDOMETRIOSIS   Diagnosis Additional Information: No value filed.    Anesthesia Type:   General, ETT     Note:  Airway :Nasal Cannula  Patient transferred to:PACU  Comments: Transferred to PACU, spontaneous respirations, at 4L via nasal cannula.  All monitors and alarms on and functioning, VSS.  Patient awake, comfortable.  Report to PACU RN.Handoff Report: Identifed the Patient, Identified the Reponsible Provider, Reviewed the pertinent medical history, Discussed the surgical course, Reviewed Intra-OP anesthesia mangement and issues during anesthesia, Set expectations for post-procedure period and Allowed opportunity for questions and acknowledgement of understanding      Vitals: (Last set prior to Anesthesia Care Transfer)    CRNA VITALS  5/24/2018 1349 - 5/24/2018 1426      5/24/2018             Pulse: 76    SpO2: 100 %    Resp Rate (observed): (!)  7    Resp Rate (set): 10                Electronically Signed By: VANESSA Han CRNA  May 24, 2018  2:26 PM

## 2018-05-24 NOTE — ANESTHESIA PREPROCEDURE EVALUATION
Anesthesia Evaluation     . Pt has had prior anesthetic.     History of anesthetic complications   - PONV        ROS/MED HX    ENT/Pulmonary:      (-) tobacco use and sleep apnea   Neurologic:      (-) seizures   Cardiovascular:     (+) ----. : . . . :. dysrhythmias PVCs, .      (-) hypertension   METS/Exercise Tolerance:     Hematologic:         Musculoskeletal:         GI/Hepatic:        (-) GERD and liver disease   Renal/Genitourinary:      (-) renal disease   Endo:      (-) Type II DM and thyroid disease   Psychiatric:         Infectious Disease:         Malignancy:         Other:                     Physical Exam  Normal systems: cardiovascular, pulmonary and dental    Airway   Mallampati: I  TM distance: >3 FB  Neck ROM: full    Dental     Cardiovascular       Pulmonary                     Anesthesia Plan      History & Physical Review  History and physical reviewed and following examination; no interval change.    ASA Status:  2 .    NPO Status:  > 8 hours    Plan for General and ETT with Intravenous induction. Maintenance will be Balanced.    PONV prophylaxis:  Ondansetron (or other 5HT-3) and Dexamethasone or Solumedrol  Propofol gtt      Postoperative Care  Postoperative pain management:  IV analgesics.      Consents  Anesthetic plan, risks, benefits and alternatives discussed with:  Patient (via )..            Procedure: Procedure(s):  LAPAROSCOPIC TUBAL DYE STUDY  LAPAROSCOPIC ABLATION ENDOMETRIOSIS  Preop diagnosis: ENDOMETRIOSIS     Allergies   Allergen Reactions     Doxycycline Nausea and Nausea and Vomiting     Past Medical History:   Diagnosis Date     Bunion, right foot      Dysmenorrhea      Endometriosis      Gastro-oesophageal reflux disease      Ovarian cyst      PVC (premature ventricular contraction)      Recurrent UTI      Past Surgical History:   Procedure Laterality Date     BUNIONECTOMY  2012    bilateral     BUNIONECTOMY ANDI AND BAILEE, COMBINED Right 6/2/2017     Procedure: COMBINED BUNIONECTOMY CHEVRON AND BAILEE;  Bailee bunionectomy, right foot;  Surgeon: Kristofer Bishop DPM;  Location: [facility] OR     CYSTECTOMY OVARIAN ONCOLOGY  2008    [place]     LAPAROSCOPIC ABLATION ENDOMETRIOSIS  2/6/2014    Procedure: LAPAROSCOPIC ABLATION ENDOMETRIOSIS;;  Surgeon: Daryl Jackson MD;  Location: [facility]     LAPAROSCOPIC TUBAL DYE STUDY  2/6/2014    Procedure: LAPAROSCOPIC TUBAL DYE STUDY;  LAPAROSCOPIC TUBAL DYE STUDIES, Cautery of Endometrosis, Excision Right Pelvic Endometrosis;  Surgeon: Daryl Jackson MD;  Location: [facility]     LAPAROTOMY EXPLORATORY       Prior to Admission medications    Medication Sig Start Date End Date Taking? Authorizing Provider   norethindrone (AYGESTIN) 5 MG tablet Take 5 mg by mouth daily   Yes Reported, Patient   Prenatal Vit-Fe Fumarate-FA (PRENATAL MULTIVITAMIN PLUS IRON) 27-0.8 MG TABS per tablet TAKE ONE TABLET BY MOUTH DAILY 4/25/18  Yes Ce Siddiqi PA   valACYclovir (VALTREX) 1000 mg tablet TAKE 2 TABLETS(2000 MG) BY MOUTH TWICE DAILY  Patient taking differently: TAKE 2 TABLETS(2000 MG) BY MOUTH TWICE DAILY as needed 4/25/18  Yes Ce Siddiqi PA     Current Facility-Administered Medications Ordered in Epic   Medication Dose Route Frequency Last Rate Last Dose     ceFAZolin (ANCEF) 1 g vial to attach to  ml bag for ADULT or 50 ml bag for PEDS  1 g Intravenous See Admin Instructions         ceFAZolin (ANCEF) intermittent infusion 2 g in 100 mL dextrose PRE-MIX  2 g Intravenous Pre-Op/Pre-procedure x 1 dose         No current [facility]-ordered outpatient prescriptions on file.     Wt Readings from Last 1 Encounters:   05/24/18 61.1 kg (134 lb 11.2 oz)     Temp Readings from Last 1 Encounters:   05/24/18 35.6  C (96.1  F) (Oral)     BP Readings from Last 6 Encounters:   05/24/18 133/69   11/10/17 124/80   06/02/17 113/65   06/01/17 120/68   05/18/17 120/80   02/24/17 126/84     Pulse Readings from Last 4  Encounters:   11/10/17 68   06/02/17 77   06/01/17 82   05/18/17 86     Resp Readings from Last 1 Encounters:   05/24/18 16     SpO2 Readings from Last 1 Encounters:   05/24/18 100%     Recent Labs   Lab Test  02/01/14   1844   NA  139   POTASSIUM  4.0   CHLORIDE  102   CO2  27   ANIONGAP  10   GLC  86   BUN  11   CR  0.75   SHARAD  9.4     Recent Labs   Lab Test  05/18/17   1032  02/03/17   1418  02/01/14   1844   WBC   --   9.8  12.5*   HGB  14.0  13.9  13.3   PLT   --   277  261                 .

## 2018-05-24 NOTE — DISCHARGE INSTRUCTIONS
Same Day Surgery Discharge Instructions for  Sedation and General Anesthesia       It's not unusual to feel dizzy, light-headed or faint for up to 24 hours after surgery or while taking pain medication.  If you have these symptoms: sit for a few minutes before standing and have someone assist you when you get up to walk or use the bathroom.      You should rest and relax for the next 24 hours. We recommend you make arrangements to have an adult stay with you for at least 24 hours after your discharge.  Avoid hazardous and strenuous activity.      DO NOT DRIVE any vehicle or operate mechanical equipment for 24 hours following the end of your surgery.  Even though you may feel normal, your reactions may be affected by the medication you have received.      Do not drink alcoholic beverages for 24 hours following surgery.       Slowly progress to your regular diet as you feel able. It's not unusual to feel nauseated and/or vomit after receiving anesthesia.  If you develop these symptoms, drink clear liquids (apple juice, ginger ale, broth, 7-up, etc. ) until you feel better.  If your nausea and vomiting persists for 24 hours, please notify your surgeon.        All narcotic pain medications, along with inactivity and anesthesia, can cause constipation. Drinking plenty of liquids and increasing fiber intake will help.      For any questions of a medical nature, call your surgeon.      Do not make important decisions for 24 hours.      If you had general anesthesia, you may have a sore throat for a couple of days related to the breathing tube used during surgery.  You may use Cepacol lozenges to help with this discomfort.  If it worsens or if you develop a fever, contact your surgeon.       If you feel your pain is not well managed with the pain medications prescribed by your surgeon, please contact your surgeon's office to let them know so they can address your concerns.         **If you have questions or concerns  about your procedure, call Dr. Yañez at [phone]**           HOME CARE FOLLOWING LAPAROSCOPY    Diet  You have no restrictions on your diet.  During the evening following surgery, drink plenty of fluids and eat a light supper.    Nausea  The anesthesia may produce some nausea.  If you feel nauseated, stay in bed and try drinking fluids such as 7-Up, tea, or soup.    Discomfort  The amount of discomfort you can expect is very unpredictable.  If you have pain that cannot be controlled with Tylenol or with the prescription you may have received, you should notify your physician.  The following complaints are not uncommon and should not be cause for concern:   1.  Abdominal tenderness; abdominal cramping   2.  Low backache or pain radiating to your shoulders, chest or back. This is a result of the gas used to inflate your abdomen during surgery. Lying flat in bed seems to help relieve this.   3.  Sore throat for a day or two resulting from the anesthesia tube used during surgery.   4.  Black or blue vida on your abdomen.     Drainage  You may expect a small amount of drainage from the incision on your abdomen and you may change the bandage when necessary.  You will also have a small amount of vaginal drainage for several days; this is normal and no cause for concern.  If excessive bleeding occurs, notify your physician.      If dye was used during your procedure, your urine will initially be bright blue. It will gradually return to yellow throughout the day. Drinking plenty of fluids will help to filter the dye from your urine.    Fever  A low grade fever (not over 100 F) is usual after this procedure.  Do not hesitate to notify your physician if your fever seems excessive.    Stitches  If your stitches are the type that must be removed, your doctor will instruct you to return to their office.    Activity  Rest on the day of surgery then you may resume your normal activity, as tolerated. Avoid heavy lifting for  one week.    You may shower.  Do not douche, and do not use tampons.  If you also had a D&C, do not resume intercourse until bleeding has ceased.      Emergency Care  Contact your physician if you have any of these problems:   1.  A fever over 100 F   2.  A large amount of bleeding or drainage   3.  Severe pain          Today you were given 975 mg of Tylenol at 1200. The recommended daily maximum dose is 4000 mg.

## 2018-05-24 NOTE — IP AVS SNAPSHOT
[facility]    [address]    [place] [address]    Phone:  [phone]    Fax:  [phone]                                       After Visit Summary   5/24/2018    Negar Vizcarra    MRN: [id number]           After Visit Summary Signature Page     I have received my discharge instructions, and my questions have been answered. I have discussed any challenges I see with this plan with the nurse or doctor.    ..........................................................................................................................................  Patient/Patient Representative Signature      ..........................................................................................................................................  Patient Representative Print Name and Relationship to Patient    ..................................................               ................................................  Date                                            Time    ..........................................................................................................................................  Reviewed by Signature/Title    ...................................................              ..............................................  Date                                                            Time

## 2018-05-24 NOTE — PROCEDURES
"REPORT OF OPERATION:  Hospital:  Patient: Negar Vizcarra   : 1990   Dictating Physician: Renetta Yañez    Date of Procedure/Consult: 2018    SURGEON: Renetta Yañez    ASSISTANT: Martina Riley    PREOPERATIVE DIAGNOSIS:   1.  Endometriosis   2. Pelvic Pain    POSTOPERATIVE DIAGNOSIS:  Same as above with patent fallopian tubes bilaterally and scant filmy adhesive disease    PROCEDURE PERFORMED:   DIAGNOSTIC LAPAROSCOPY, LAPAROSCOPIC BILATERAL TUBAL DYE STUDIES, CAUTERIZATION OF ENDOMETRIOSIS, I and D and destruction of posterior cul de sac inclusion cyst AND LYSIS OF SINGLE FILMY ADHESION  ( - Wound Class: I-Clean   - Wound Class: II-Clean Contaminated  Drains: None  Specimen Removed  None  Findings:   Normal appearing upper abdomen with normal liver, gallbladder and stomach.  Normal patent fallopian tubes on tubal dye study.  Small amount filmy adhesion in left ovarian fossa from side wall to ovary- also with small paratubal cyst noted.  Right ovary and fallopian tube normal with free fill and spill of blue dye noted.  Normal appearing uterus although with filmy exudate/appeared consistent with \"burned out\" endometriosis- on posterior left uterus.  Inclusion cyst noted in right posterior cul de sac.  Single endometriotic implant in right posterior cul de sac near inclusion cyst.  In distal right pelvic sidewall two endometriotic implants noted- one small 4 mm circular lesion and one plaque of filmy cystic appeared area measuring around 2 x 1 cm in size.  No anterior endometriotic disease.      Overall minimal active appearing endometriosis- areas of \"burned out\" cystic appearing endometriosis present which were all cauterized/excised.     Grafts or implants: None    OPERATIVE INDICATION AND DESCRIPTION: On the afternoon of 2018, the patient was taken to the operating room and placed in the supine position and general anesthesia induced, she was intubated and her legs were elevated in the " "Rosalio palmer.  She was prepped and draped in the usual manner for pelviscopy. A proper TIME OUT was taken.   A speculum was placed in the vagina, a single tooth tenaculum was placed on the anterior lip of the cervix, a cone intrauterine manipulator was placed. Gloves were changed, and we moved to the abdomen.       About 2 cc of 0.5% Marcaine was injected in the infraumbilical area and a 5 mm skin incision was made.  A Step Veress needle was placed with placement confirmed with saline withdraw and drop test. The gas was connected and a pneumoperitoneum was insufflated- starting pressure was 1 mmHg.  The 5 mm Step trocar was placed through the Veress needle and correct intraperitoneal placement confirmed.      An additional 5 mm port was placed in the left lower quadrant after injecting about 2 cc of 0.5% Marcaine and making a small incision. This was also done under direct visualization. The pelvis was inspected.  Findings noted above.    Dilute methylene blue was infiltrated through the cone uterine manipulator with immediate free fill and spill of the fallopian tubes bilaterally.      The pelvis was suction irrigated.    Attention was directed to the filmy adhesion in the left ovarian fossa to the ovary which was lysed with blunt dissection.  Hemostasis was noted.      Attention was then directed to the small 1 cm filmy plaque on the posterior left uterus which appeared c/w \"burned out\" possible endometriosis.  This area was peeled off from the uterus with no residual area noted.  Hemostasis noted.      Attention was directed to the right sidewall and endometriotic implants cauterized with care to avoid injury to the ureter, surrounding structures.    Attention directed to the posterior cl de sac.  The inclusion cyst was grasped with a blunt grasper and spilled mucoid substance.  This was then suction irrigated and noted to be completely emptied.  The area was then cauterized with destruction of area noted.  " All done with care to avoid injury to surrounding bowel.  A single endometriotic implant above this area was also cauterized.      At completion of procedure, all endometriotic implants were treated.      We rinsed the pelvis with lactated ringers, until this returned clear on suction.  Everything was hemostatic.       The instruments were removed, the gas was allowed to escape and the subcutaneous tissue of the incisions were closed with interrupted stitches of 4-0 Monocryl suture. Dermabond was placed.  The Cone was removed.  The sponge and needle counts were correct. The patient was awakened in the operating room and taken to the recovery room in good condition having tolerated the procedure well. Complications: None      Renetta Yañez

## 2018-05-24 NOTE — IP AVS SNAPSHOT
MRN:[id number]                      After Visit Summary   5/24/2018    Negar Vizcarra    MRN: [id number]           Thank you!     Thank you for choosing [facility] for your care. Our goal is always to provide you with excellent care. Hearing back from our patients is one way we can continue to improve our services. Please take a few minutes to complete the written survey that you may receive in the mail after you visit with us. Thank you!        Patient Information     Date Of Birth          1990        About your hospital stay     You were admitted on:  May 24, 2018 [facility]    You were discharged on:  May 24, 2018       Who to Call     For medical emergencies, please call 911.  For non-urgent questions about your medical care, please call your primary care provider or clinic, [phone]  For questions related to your surgery, please call your surgery clinic        Attending Provider     Provider Renetta Khan MD OB/Gyn       Primary Care Provider Office Phone # Fax #    GABY Chou [phone]      After Care Instructions     Discharge Instructions       Resume pre procedure diet            Discharge Instructions       Pelvic Rest. No tampons, douching or intercourse for 2  weeks.            Discharge Instructions       Patient to arrange follow up appointment in 2 weeks            Dressing       Keep dressing clean and dry, change as instructed by Provider or RN            Ice to affected area       PRN as tolerated            No alcohol       NO ALCOHOL for 24 hours post procedure            No driving or operating machinery       No driving or operating machinery until day after procedure            No lifting       No lifting over 20 pounds and no strenuous physical activity.  For 4 weeks            Shower        Shower on Post-op day  1.   DO NOT take a bath                   Further instructions from your care team          Same Day Surgery Discharge Instructions for  Sedation and General Anesthesia       It's not unusual to feel dizzy, light-headed or faint for up to 24 hours after surgery or while taking pain medication.  If you have these symptoms: sit for a few minutes before standing and have someone assist you when you get up to walk or use the bathroom.      You should rest and relax for the next 24 hours. We recommend you make arrangements to have an adult stay with you for at least 24 hours after your discharge.  Avoid hazardous and strenuous activity.      DO NOT DRIVE any vehicle or operate mechanical equipment for 24 hours following the end of your surgery.  Even though you may feel normal, your reactions may be affected by the medication you have received.      Do not drink alcoholic beverages for 24 hours following surgery.       Slowly progress to your regular diet as you feel able. It's not unusual to feel nauseated and/or vomit after receiving anesthesia.  If you develop these symptoms, drink clear liquids (apple juice, ginger ale, broth, 7-up, etc. ) until you feel better.  If your nausea and vomiting persists for 24 hours, please notify your surgeon.        All narcotic pain medications, along with inactivity and anesthesia, can cause constipation. Drinking plenty of liquids and increasing fiber intake will help.      For any questions of a medical nature, call your surgeon.      Do not make important decisions for 24 hours.      If you had general anesthesia, you may have a sore throat for a couple of days related to the breathing tube used during surgery.  You may use Cepacol lozenges to help with this discomfort.  If it worsens or if you develop a fever, contact your surgeon.       If you feel your pain is not well managed with the pain medications prescribed by your surgeon, please contact your surgeon's office to let them know so they can address your concerns.          **If you have questions or concerns about your procedure, call Dr. Yañez at [phone]**           HOME CARE FOLLOWING LAPAROSCOPY    Diet  You have no restrictions on your diet.  During the evening following surgery, drink plenty of fluids and eat a light supper.    Nausea  The anesthesia may produce some nausea.  If you feel nauseated, stay in bed and try drinking fluids such as 7-Up, tea, or soup.    Discomfort  The amount of discomfort you can expect is very unpredictable.  If you have pain that cannot be controlled with Tylenol or with the prescription you may have received, you should notify your physician.  The following complaints are not uncommon and should not be cause for concern:   1.  Abdominal tenderness; abdominal cramping   2.  Low backache or pain radiating to your shoulders, chest or back. This is a result of the gas used to inflate your abdomen during surgery. Lying flat in bed seems to help relieve this.   3.  Sore throat for a day or two resulting from the anesthesia tube used during surgery.   4.  Black or blue vida on your abdomen.     Drainage  You may expect a small amount of drainage from the incision on your abdomen and you may change the bandage when necessary.  You will also have a small amount of vaginal drainage for several days; this is normal and no cause for concern.  If excessive bleeding occurs, notify your physician.      If dye was used during your procedure, your urine will initially be bright blue. It will gradually return to yellow throughout the day. Drinking plenty of fluids will help to filter the dye from your urine.    Fever  A low grade fever (not over 100 F) is usual after this procedure.  Do not hesitate to notify your physician if your fever seems excessive.    Stitches  If your stitches are the type that must be removed, your doctor will instruct you to return to their office.    Activity  Rest on the day of surgery then you may resume your normal  "activity, as tolerated. Avoid heavy lifting for one week.    You may shower.  Do not douche, and do not use tampons.  If you also had a D&C, do not resume intercourse until bleeding has ceased.      Emergency Care  Contact your physician if you have any of these problems:   1.  A fever over 100 F   2.  A large amount of bleeding or drainage   3.  Severe pain          Today you were given 975 mg of Tylenol at 1200. The recommended daily maximum dose is 4000 mg.     Pending Results     No orders found from 2018 to 2018.            Admission Information     Date & Time Provider Department Dept. Phone    2018 Renetta Yañez MD [facility] Same [facility] [phone]      Your Vitals Were     Blood Pressure Temperature Respirations Height Weight Last Period    116 98.4  F (36.9  C) (Temporal) 14 1.626 m (5' 4\") 61.1 kg (134 lb 11.2 oz) 2018    Pulse Oximetry BMI (Body Mass Index)                99% 23.12 kg/m2          [employer] Information     [employer] lets you send messages to your doctor, view your test results, renew your prescriptions, schedule appointments and more. To sign up, go to [email or web address] . Click on \"Log in\" on the left side of the screen, which will take you to the Welcome page. Then click on \"Sign up Now\" on the right side of the page.     You will be asked to enter the access code listed below, as well as some personal information. Please follow the directions to create your username and password.     Your access code is: [email or web address]  Expires: 2018  3:06 PM     Your access code will  in 90 days. If you need help or a new code, please call your [facility] clinic or [phone].        Care EveryWhere ID     This is your Care EveryWhere ID. This could be used by other organizations to access your [facility] medical records  [id number]        Equal Access to Services     YADIEL TURNER AH: Kary Michele, jennifer boggs, jeet arguello " olya wright essie camejo'aan ah. So [facility] [phone].    ATENCIÓN: Si richie barriga, tiene a alonzo disposición servicios gratuitos de asistencia lingüística. Lali al [phone].    We comply with applicable federal civil rights laws and [place] laws. We do not discriminate on the basis of race, color, national origin, age, disability, sex, sexual orientation, or gender identity.               Review of your medicines      START taking        Dose / Directions    acetaminophen 325 MG tablet   Commonly known as:  TYLENOL   Used for:  Pelvic pain        Dose:  650 mg   Take 2 tablets (650 mg) by mouth every 4 hours as needed for other (mild pain)   Quantity:  100 tablet   Refills:  0       ibuprofen 600 MG tablet   Commonly known as:  ADVIL/MOTRIN   Used for:  Pelvic pain        Dose:  600 mg   Take 1 tablet (600 mg) by mouth every 6 hours as needed for pain (mild)   Quantity:  30 tablet   Refills:  0       ondansetron 4 MG ODT tab   Commonly known as:  ZOFRAN-ODT   Used for:  Nausea        Dose:  4-8 mg   Take 1-2 tablets (4-8 mg) by mouth every 8 hours as needed for nausea or vomiting   Quantity:  30 tablet   Refills:  0       oxyCODONE IR 5 MG tablet   Commonly known as:  ROXICODONE   Used for:  Pelvic pain        Dose:  5-10 mg   Take 1-2 tablets (5-10 mg) by mouth every 3 hours as needed for pain or other (Moderate to Severe)   Quantity:  12 tablet   Refills:  0       senna-docusate 8.6-50 MG per tablet   Commonly known as:  SENOKOT-S;PERICOLACE   Used for:  Drug-induced constipation        Dose:  1-2 tablet   Take 1-2 tablets by mouth 2 times daily Take while on oral narcotics to prevent or treat constipation.   Quantity:  30 tablet   Refills:  0         CONTINUE these medicines which may have CHANGED, or have new prescriptions. If we are uncertain of the size of tablets/capsules you have at home, strength may be listed as something that might have changed.        Dose / Directions     valACYclovir 1000 mg tablet   Commonly known as:  VALTREX   This may have changed:  See the new instructions.   Used for:  Recurrent cold sores        TAKE 2 TABLETS(2000 MG) BY MOUTH TWICE DAILY   Quantity:  4 tablet   Refills:  3         CONTINUE these medicines which have NOT CHANGED        Dose / Directions    norethindrone 5 MG tablet   Commonly known as:  AYGESTIN        Dose:  5 mg   Take 5 mg by mouth daily   Refills:  0       prenatal multivitamin plus iron 27-0.8 MG Tabs per tablet   Used for:  Prenatal consult        TAKE ONE TABLET BY MOUTH DAILY   Quantity:  100 tablet   Refills:  3            Where to get your medicines      Some of these will need a paper prescription and others can be bought over the counter. Ask your nurse if you have questions.     Bring a paper prescription for each of these medications     acetaminophen 325 MG tablet    ibuprofen 600 MG tablet    ondansetron 4 MG ODT tab    oxyCODONE IR 5 MG tablet    senna-docusate 8.6-50 MG per tablet                Protect others around you: Learn how to safely use, store and throw away your medicines at www.disposemymeds.org.        Information about OPIOIDS     PRESCRIPTION OPIOIDS: WHAT YOU NEED TO KNOW   You have a prescription for an opioid (narcotic) pain medicine. Opioids can cause addiction. If you have a history of chemical dependency of any type, you are at a higher risk of becoming addicted to opioids. Only take this medicine after all other options have been tried. Take it for as short a time and as few doses as possible.     Do not:    Drive. If you drive while taking these medicines, you could be arrested for driving under the influence (DUI).    Operate heavy machinery    Do any other dangerous activities while taking these medicines.     Drink any alcohol while taking these medicines.      Take with any other medicines that contain acetaminophen. Read all labels carefully. Look for the word  acetaminophen  or  Tylenol.  Ask  your pharmacist if you have questions or are unsure.    Store your pills in a secure place, locked if possible. We will not replace any lost or stolen medicine. If you don t finish your medicine, please throw away (dispose) as directed by your pharmacist. The [place] [employer]ution [employer] has more information about safe disposal: https://www.pca.[facility].[place].us/living-green/managing-unwanted-medications    All opioids tend to cause constipation. Drink plenty of water and eat foods that have a lot of fiber, such as fruits, vegetables, prune juice, apple juice and high-fiber cereal. Take a laxative (Miralax, milk of magnesia, Colace, Senna) if you don t move your bowels at least every other day.              Medication List: This is a list of all your medications and when to take them. Check marks below indicate your daily home schedule. Keep this list as a reference.      Medications           Morning Afternoon Evening Bedtime As Needed    acetaminophen 325 MG tablet   Commonly known as:  TYLENOL   Take 2 tablets (650 mg) by mouth every 4 hours as needed for other (mild pain)   Last time this was given:  975 mg on 5/24/2018 12:14 PM                                ibuprofen 600 MG tablet   Commonly known as:  ADVIL/MOTRIN   Take 1 tablet (600 mg) by mouth every 6 hours as needed for pain (mild)                                norethindrone 5 MG tablet   Commonly known as:  AYGESTIN   Take 5 mg by mouth daily                                ondansetron 4 MG ODT tab   Commonly known as:  ZOFRAN-ODT   Take 1-2 tablets (4-8 mg) by mouth every 8 hours as needed for nausea or vomiting                                oxyCODONE IR 5 MG tablet   Commonly known as:  ROXICODONE   Take 1-2 tablets (5-10 mg) by mouth every 3 hours as needed for pain or other (Moderate to Severe)                                prenatal multivitamin plus iron 27-0.8 MG Tabs per tablet   TAKE ONE TABLET BY MOUTH DAILY                                 senna-docusate 8.6-50 MG per tablet   Commonly known as:  SENOKOT-S;PERICOLACE   Take 1-2 tablets by mouth 2 times daily Take while on oral narcotics to prevent or treat constipation.                                valACYclovir 1000 mg tablet   Commonly known as:  VALTREX   TAKE 2 TABLETS(2000 MG) BY MOUTH TWICE DAILY

## 2018-05-24 NOTE — DISCHARGE SUMMARY
GYN DISCHARGE SUMMARY  Patient Name: Negar Vizcarra  YOB: 1990  Medical Record Number: [id number]  Attending Provider: Renetta Yañez MD  Admission Date: 5/24/2018  Discharge Date: 5/24/2018    Negar Vizcarra will be discharged to home. Underwent uncomplicated procedure.  She did well postoperatively and was discharged home on POD#0.  Condition is good.    PRINCIPAL DIAGNOSIS  Pelvic pain  Endometriosis     FINAL DIAGNOSES  Pelvic pain   Endometriosis     PROCEDURES PERFORMED  Diagnostic laparoscopy, tubal dye study, cauterization of endometriosis, I and D of inclusion cyst, lysis of adhesions    DISCHARGE MEDICATIONS  Current Discharge Medication List      START taking these medications    Details   acetaminophen (TYLENOL) 325 MG tablet Take 2 tablets (650 mg) by mouth every 4 hours as needed for other (mild pain)  Qty: 100 tablet, Refills: 0    Associated Diagnoses: Pelvic pain      ibuprofen (ADVIL/MOTRIN) 600 MG tablet Take 1 tablet (600 mg) by mouth every 6 hours as needed for pain (mild)  Qty: 30 tablet, Refills: 0    Associated Diagnoses: Pelvic pain      ondansetron (ZOFRAN-ODT) 4 MG ODT tab Take 1-2 tablets (4-8 mg) by mouth every 8 hours as needed for nausea or vomiting  Qty: 30 tablet, Refills: 0    Associated Diagnoses: Nausea      oxyCODONE IR (ROXICODONE) 5 MG tablet Take 1-2 tablets (5-10 mg) by mouth every 3 hours as needed for pain or other (Moderate to Severe)  Qty: 12 tablet, Refills: 0    Associated Diagnoses: Pelvic pain      senna-docusate (SENOKOT-S;PERICOLACE) 8.6-50 MG per tablet Take 1-2 tablets by mouth 2 times daily Take while on oral narcotics to prevent or treat constipation.  Qty: 30 tablet, Refills: 0    Comments: While on narcotics  Associated Diagnoses: Drug-induced constipation         CONTINUE these medications which have NOT CHANGED    Details   norethindrone (AYGESTIN) 5 MG tablet Take 5 mg by mouth daily      Prenatal Vit-Fe Fumarate-FA (PRENATAL  MULTIVITAMIN PLUS IRON) 27-0.8 MG TABS per tablet TAKE ONE TABLET BY MOUTH DAILY  Qty: 100 tablet, Refills: 3    Associated Diagnoses: Prenatal consult      valACYclovir (VALTREX) 1000 mg tablet TAKE 2 TABLETS(2000 MG) BY MOUTH TWICE DAILY  Qty: 4 tablet, Refills: 3    Associated Diagnoses: Recurrent cold sores              DISCHARGE PROCEDURES AND FOLLOW-UP    Discharge Procedure Orders  No lifting   Order Comments: No lifting over 20 pounds and no strenuous physical activity.  For 4 weeks     No alcohol   Order Comments: NO ALCOHOL for 24 hours post procedure     No driving or operating machinery   Order Comments: No driving or operating machinery until day after procedure     Discharge Instructions   Order Comments: Resume pre procedure diet     Ice to affected area   Order Comments: PRN as tolerated     Dressing   Order Comments: Keep dressing clean and dry, change as instructed by Provider or RN     Shower    Order Comments: Shower on Post-op day  1.   DO NOT take a bath     Discharge Instructions   Order Comments: Pelvic Rest. No tampons, douching or intercourse for 2  weeks.     Discharge Instructions   Order Comments: Patient to arrange follow up appointment in 2 weeks         ABNORMAL LABS  None    POST-OPERATIVE COMPLICATIONS  None    DISCHARGE DIAGNOSES  Pelvic pain  Endometriosis     Time: 30 minutes or less  Renetta Yañez  5/24/2018  4:59 PM

## 2018-06-12 NOTE — ANESTHESIA POSTPROCEDURE EVALUATION
Patient: Negar Vizcarra    Procedure(s):  DIAGNOSTIC LAPAROSCOPY, LAPAROSCOPIC BILATERAL TUBAL DYE STUDIES, CAUTERIZATION OF ENDOMETRIOSIS( - Wound Class: I-Clean   - Wound Class: II-Clean Contaminated    Diagnosis:ENDOMETRIOSIS   Diagnosis Additional Information: No value filed.    Anesthesia Type:  General, ETT    Note:  Anesthesia Post Evaluation    Patient location during evaluation: PACU  Patient participation: Able to fully participate in evaluation  Level of consciousness: awake and alert  Pain management: satisfactory to patient  Airway patency: patent  Cardiovascular status: hemodynamically stable  Respiratory status: acceptable and unassisted  Hydration status: balanced  PONV: none     Anesthetic complications: None          Last vitals:  Vitals:    05/24/18 1545 05/24/18 1600 05/24/18 1615   BP: 116/75 116/75    Resp: 18 14 24   Temp:  36.9  C (98.4  F)    SpO2: 99% 99% 98%         Electronically Signed By: Ean Sher MD  June 12, 2018  6:48 AM

## 2019-12-01 NOTE — PROGRESS NOTES
SUBJECTIVE:                                                    Negar Vizcarra is a 29 year old female who presents to clinic today for the following health issues:    Chief Complaint   Patient presents with     Allergies     since  which makes her break out so wants to know what to do     Hair Loss     x 6 months but in the last month feels it is getting worse     Due to language barrier, an  was present during the history-taking and subsequent discussion (and for part of the physical exam) with this patient.      1. Started in  with concerns of allergies  Was camping, had mosquito bite  Broke out at that time  Taking Fexofenadine daily 180 mg  Will get rash Daily  Rash is on head, arms, trunk    Bought dog in Sept - sx started prior to dog.  Sx worse since dog  Childhood dog/cats - no issues   No new shampoo, lotion/soap      2. Hair loss - 2 months noticed thinning of hair  No new shampoo  No heat/cold intolerance    Periods - regular  No other bleeding  Sister with hair loss after pg.     Stressors: sister  after  at age 28 in Sept.  Pt declines referral to therapy.  Support at home is good    Mireya is .  [phone]  [facility]          ROS:  Constitutional: NEGATIVE for fever, chills, change in weight  Eyes: NEGATIVE for vision changes or irritation  Ears: NEGATIVE for pain, discharge, decreased hearing  Nose: NEGATIVE for rhinorrhea, epistaxis or congestion  Mouth: NEGATIVE for ulcerations, sore throat.   R: NEGATIVE for significant cough or SOB  CV: NEGATIVE for chest pain, or peripheral edema - does have asymptomatic palpitations  GI: NEGATIVE for nausea, abdominal pain, heartburn, or change in bowel habits  : NEGATIVE for frequency, dysuria, or hematuria  Neuro: NEGATIVE for headaches, weakness, syncope        BP Readings from Last 3 Encounters:   19 120/74   18 116/75   11/10/17 124/80    Wt Readings from Last 3 Encounters:   19  57.8 kg (127 lb 6.4 oz)   05/24/18 61.1 kg (134 lb 11.2 oz)   11/10/17 62.6 kg (138 lb)            Patient Active Problem List   Diagnosis     ACP (advance care planning)     Health Care Home     Female infertility     Endometriosis     Breast cyst, right     PVC (premature ventricular contraction)     Recurrent cold sores     Past Surgical History:   Procedure Laterality Date     BUNIONECTOMY  2012    bilateral     BUNIONECTOMY CHEVRON AND BAILEE, COMBINED Right 6/2/2017    Procedure: COMBINED BUNIONECTOMY CHEVRON AND BAILEE;  Bailee bunionectomy, right foot;  Surgeon: Kristofer Bishop DPM;  Location: [facility]     CYSTECTOMY OVARIAN ONCOLOGY  2008    [place]     LAPAROSCOPIC ABLATION ENDOMETRIOSIS  2/6/2014    Procedure: LAPAROSCOPIC ABLATION ENDOMETRIOSIS;;  Surgeon: Daryl Jackson MD;  Location: [facility]     LAPAROSCOPIC ABLATION ENDOMETRIOSIS N/A 5/24/2018    Procedure: LAPAROSCOPIC ABLATION ENDOMETRIOSIS;;  Surgeon: Renetta Yañez MD;  Location: [facility]     LAPAROSCOPIC TUBAL DYE STUDY  2/6/2014    Procedure: LAPAROSCOPIC TUBAL DYE STUDY;  LAPAROSCOPIC TUBAL DYE STUDIES, Cautery of Endometrosis, Excision Right Pelvic Endometrosis;  Surgeon: Daryl Jackson MD;  Location: [facility]     LAPAROSCOPIC TUBAL DYE STUDY Bilateral 5/24/2018    Procedure: LAPAROSCOPIC TUBAL DYE STUDY;  DIAGNOSTIC LAPAROSCOPY, LAPAROSCOPIC BILATERAL TUBAL DYE STUDIES, CAUTERIZATION OF ENDOMETRIOSIS(;  Surgeon: Renetta Yañez MD;  Location: [facility]     LAPAROTOMY EXPLORATORY         Social History     Tobacco Use     Smoking status: Never Smoker     Smokeless tobacco: Never Used   Substance Use Topics     Alcohol use: No     Family History   Problem Relation Age of Onset     Hypertension Mother      Hypertension Father      Cerebrovascular Disease Father      Lipids Father      Diabetes Maternal Grandmother      Cerebrovascular Disease Maternal Grandfather          Current Outpatient Medications   Medication Sig Dispense Refill  "    Biotin 10 MG CAPS        fish oil-omega-3 fatty acids 1000 MG capsule Take 2 g by mouth daily       folic acid (FOLVITE) 1 MG tablet Take 1 mg by mouth daily       Prenatal Vit-Fe Fumarate-FA (PRENATAL MULTIVITAMIN PLUS IRON) 27-0.8 MG TABS per tablet TAKE ONE TABLET BY MOUTH DAILY 100 tablet 3     vitamin E 400 units TABS Take 400 Units by mouth daily       acetaminophen (TYLENOL) 325 MG tablet Take 2 tablets (650 mg) by mouth every 4 hours as needed for other (mild pain) 100 tablet 0     ibuprofen (ADVIL/MOTRIN) 600 MG tablet Take 1 tablet (600 mg) by mouth every 6 hours as needed for pain (mild) 30 tablet 0     senna-docusate (SENOKOT-S;PERICOLACE) 8.6-50 MG per tablet Take 1-2 tablets by mouth 2 times daily Take while on oral narcotics to prevent or treat constipation. 30 tablet 0     valACYclovir (VALTREX) 1000 mg tablet TAKE 2 TABLETS(2000 MG) BY MOUTH TWICE DAILY (Patient taking differently: TAKE 2 TABLETS(2000 MG) BY MOUTH TWICE DAILY as needed) 4 tablet 3       Allergies   Allergen Reactions     Doxycycline Nausea and Nausea and Vomiting       OBJECTIVE:                                                    /74 (BP Location: Left arm, Patient Position: Sitting, Cuff Size: Adult Regular)   Pulse 83   Temp 98  F (36.7  C) (Oral)   Ht 1.626 m (5' 4\")   Wt 57.8 kg (127 lb 6.4 oz)   LMP 11/23/2019   SpO2 97%   BMI 21.87 kg/m   Body mass index is 21.87 kg/m .     GENERAL: alert and no distress  HEAD: Normocephalic, atraumatic  EYES: Eyes grossly normal to inspection, extraocular movements - intact  EARS:   Right: External ear and canal normal, TM normal  Left: External ear and canal normal, TM normal  NOSE: No discharge noted  MOUTH/THROAT: no ulcers, no lesions, pharynx non-erythematous, no exudates present, tonsils without hypertrophy, mucous membranes moist.   NECK: no tenderness, no adenopathy, no asymmetry, no masses, no stiffness; thyroid- normal to palpation  RESP: lungs clear to auscultation " - no crackles, no rhonchi, no wheezes  CV: regular rates and rhythm, normal S1 S2, no S3 or S4 and no murmur, no click or rub -         ASSESSMENT/PLAN:                                                      1. Urticaria  Continue OTC meds  Add Benadryl at bedtime  See Allergist  - ALLERGY/ASTHMA ADULT REFERRAL  - DERMATOLOGY REFERRAL    2. Hair loss  Suspect due to stress of sister's death.  Labs pending  Rogaine OTC  Derm if not improving  - HEMOGRAM/PLATELET ([facility])  - VENOUS COLLECTION  - FERRITIN ([facility])  - Iron and iron binding capacity  - TSH with free T4 reflex ([facility])  - DERMATOLOGY REFERRAL        Ce Siddiqi PA-C  [facility]

## 2019-12-02 ENCOUNTER — OFFICE VISIT (OUTPATIENT)
Dept: FAMILY MEDICINE | Facility: CLINIC | Age: 29
End: 2019-12-02

## 2019-12-02 VITALS
DIASTOLIC BLOOD PRESSURE: 74 MMHG | WEIGHT: 127.4 LBS | HEIGHT: 64 IN | SYSTOLIC BLOOD PRESSURE: 120 MMHG | BODY MASS INDEX: 21.75 KG/M2 | HEART RATE: 83 BPM | OXYGEN SATURATION: 97 % | TEMPERATURE: 98 F

## 2019-12-02 DIAGNOSIS — L50.9 URTICARIA: Primary | ICD-10-CM

## 2019-12-02 DIAGNOSIS — L65.9 HAIR LOSS: ICD-10-CM

## 2019-12-02 LAB
ERYTHROCYTE [DISTWIDTH] IN BLOOD BY AUTOMATED COUNT: 12.1 %
HCT VFR BLD AUTO: 43.4 % (ref 35–47)
HEMOGLOBIN: 14.2 G/DL (ref 11.7–15.7)
MCH RBC QN AUTO: 29.5 PG (ref 26–33)
MCHC RBC AUTO-ENTMCNC: 32.7 G/DL (ref 31–36)
MCV RBC AUTO: 90 FL (ref 78–100)
PLATELET COUNT - QUEST: 331 10^9/L (ref 150–375)
RBC # BLD AUTO: 4.82 10*12/L (ref 3.8–5.2)
WBC # BLD AUTO: 10 10*9/L (ref 4–11)

## 2019-12-02 PROCEDURE — 99213 OFFICE O/P EST LOW 20 MIN: CPT | Performed by: PHYSICIAN ASSISTANT

## 2019-12-02 PROCEDURE — 85027 COMPLETE CBC AUTOMATED: CPT | Performed by: PHYSICIAN ASSISTANT

## 2019-12-02 PROCEDURE — 83540 ASSAY OF IRON: CPT | Mod: 90 | Performed by: PHYSICIAN ASSISTANT

## 2019-12-02 PROCEDURE — 84443 ASSAY THYROID STIM HORMONE: CPT | Mod: 90 | Performed by: PHYSICIAN ASSISTANT

## 2019-12-02 PROCEDURE — 82728 ASSAY OF FERRITIN: CPT | Mod: 90 | Performed by: PHYSICIAN ASSISTANT

## 2019-12-02 PROCEDURE — 83550 IRON BINDING TEST: CPT | Mod: 90 | Performed by: PHYSICIAN ASSISTANT

## 2019-12-02 PROCEDURE — 36415 COLL VENOUS BLD VENIPUNCTURE: CPT | Performed by: PHYSICIAN ASSISTANT

## 2019-12-02 RX ORDER — CYANOCOBALAMIN (VITAMIN B-12) 500 MCG
400 LOZENGE ORAL DAILY
COMMUNITY

## 2019-12-02 RX ORDER — BIOTIN 10000 MCG
CAPSULE ORAL
COMMUNITY

## 2019-12-02 RX ORDER — FOLIC ACID 1 MG/1
1 TABLET ORAL DAILY
COMMUNITY

## 2019-12-02 RX ORDER — CHLORAL HYDRATE 500 MG
2 CAPSULE ORAL DAILY
COMMUNITY
End: 2020-10-01

## 2019-12-02 ASSESSMENT — MIFFLIN-ST. JEOR: SCORE: 1282.88

## 2019-12-02 NOTE — NURSING NOTE
Negar is here for allergies and hair loss    Pre-visit Screening:  Immunizations:  UTD declines  Colonoscopy:  NA  Mammogram: NA  Asthma Action Test/Plan:  NA  PHQ9:  None  GAD7:  None  Questioned patient about current smoking habits Pt. has never smoked.  Ok to leave detailed message on voice mail for today's visit only No, phone # [phone]

## 2019-12-02 NOTE — LETTER
December 3, 2019      Negar Vizcarra  [address] [place] [place] [address]        Dear ,    Labs were all normal. Please try the Rogaine and schedule with dermatologist if you don't see improvement in hair growth within 2 months.  I'm so sorry to hear about your sister.     Resulted Orders   HEMOGRAM/PLATELET ([facility])   Result Value Ref Range    WBC 10.0 4.0 - 11 10*9/L    RBC Count 4.82 3.8 - 5.2 10*12/L    Hemoglobin 14.2 11.7 - 15.7 g/dL    Hematocrit 43.4 35.0 - 47.0 %    MCV 90.0 78 - 100 fL    MCH 29.5 26 - 33 pg    MCHC 32.7 31 - 36 g/dL    RDW 12.1 %    Platelet Count 331 150 - 375 10^9/L   FERRITIN ([facility])   Result Value Ref Range    Ferritin 26 16 - 154 ng/mL   Iron and iron binding capacity   Result Value Ref Range    Iron 57 40 - 190 mcg/dL    TIBC 320 250 - 450 mcg/dL (calc)    % Saturation 18 16 - 45 % (calc)   TSH with free T4 reflex ([facility])   Result Value Ref Range    TSH 3.01 mIU/L      Comment:                Reference Range                         > or = 20 Years  0.40-4.50                              Pregnancy Ranges            First trimester    0.26-2.66            Second trimester   0.55-2.73            Third trimester    0.43-2.91         If you have any questions or concerns, please call the clinic at the number listed above.       Sincerely,        GABY Chou

## 2019-12-03 LAB
% SATURATION - QUEST: 18 % (CALC) (ref 16–45)
FERRITIN SERPL-MCNC: 26 NG/ML (ref 16–154)
IRON: 57 MCG/DL (ref 40–190)
TIBC - QUEST: 320 MCG/DL (CALC) (ref 250–450)
TSH SERPL-ACNC: 3.01 MIU/L

## 2019-12-30 ENCOUNTER — TRANSFERRED RECORDS (OUTPATIENT)
Dept: FAMILY MEDICINE | Facility: CLINIC | Age: 29
End: 2019-12-30

## 2020-01-11 ENCOUNTER — TRANSFERRED RECORDS (OUTPATIENT)
Dept: FAMILY MEDICINE | Facility: CLINIC | Age: 30
End: 2020-01-11

## 2020-10-01 ENCOUNTER — OFFICE VISIT (OUTPATIENT)
Dept: FAMILY MEDICINE | Facility: CLINIC | Age: 30
End: 2020-10-01

## 2020-10-01 VITALS
HEART RATE: 65 BPM | OXYGEN SATURATION: 98 % | WEIGHT: 141 LBS | SYSTOLIC BLOOD PRESSURE: 112 MMHG | TEMPERATURE: 98.6 F | BODY MASS INDEX: 24.2 KG/M2 | DIASTOLIC BLOOD PRESSURE: 70 MMHG

## 2020-10-01 DIAGNOSIS — R76.8 THYROID ANTIBODY POSITIVE: Primary | ICD-10-CM

## 2020-10-01 DIAGNOSIS — E55.9 VITAMIN D DEFICIENCY: ICD-10-CM

## 2020-10-01 PROCEDURE — 36415 COLL VENOUS BLD VENIPUNCTURE: CPT | Performed by: PHYSICIAN ASSISTANT

## 2020-10-01 PROCEDURE — 82306 VITAMIN D 25 HYDROXY: CPT | Mod: 90 | Performed by: PHYSICIAN ASSISTANT

## 2020-10-01 PROCEDURE — 84443 ASSAY THYROID STIM HORMONE: CPT | Mod: 90 | Performed by: PHYSICIAN ASSISTANT

## 2020-10-01 PROCEDURE — 99213 OFFICE O/P EST LOW 20 MIN: CPT | Performed by: PHYSICIAN ASSISTANT

## 2020-10-01 PROCEDURE — 84439 ASSAY OF FREE THYROXINE: CPT | Mod: 90 | Performed by: PHYSICIAN ASSISTANT

## 2020-10-01 NOTE — LETTER
October 5, 2020      Negar Vizcarra  [address] [facility] [address]        Dear ,    We are writing to inform you of your test results.    Thyroid function is normal.      Your Vitamin  D is low, requiring a prescription medication. I have sent this to the pharmacy. One pill weekly for 12 weeks. (Your insurance may only cover one month at a time so if this is the case, make sure you take it for 3 months total). Please schedule a lab only appointment to recheck your vitamin D level after the last pill.    If your insurance doesn't cover this pill, instead please start taking 5,000 international unit(s) Vitamin D3 EVERY DAY, then return for a lab only in 12 weeks.      Resulted Orders   Vitamin D deficiency screening ([facility])   Result Value Ref Range    Vitamin D 25-OH Total 16 (L) 30 - 100 ng/mL      Comment:      Vitamin D Status         25-OH Vitamin D:     Deficiency:                    <20 ng/mL  Insufficiency:             20 - 29 ng/mL  Optimal:                 > or = 30 ng/mL     For 25-OH Vitamin D testing on patients on   D2-supplementation and patients for whom quantitation   of D2 and D3 fractions is required, the QuestAssureD(TM)  25-OH VIT D, (D2,D3), LC/MS/MS is recommended: order   code 58925 (patients >2yrs).  See Note 1     Note 1     For additional information, please refer to   http://education.[facility].Channel M/faq/CPA990   (This link is being provided for informational/  educational purposes only.)     TSH ([facility])   Result Value Ref Range    TSH 1.79 mIU/L      Comment:                Reference Range                         > or = 20 Years  0.40-4.50                              Pregnancy Ranges            First trimester    0.26-2.66            Second trimester   0.55-2.73            Third trimester    0.43-2.91     T4 free ([facility])   Result Value Ref Range    T4 Free, Non-Dialysis 1.2 0.8 - 1.8 ng/dL       If you have any questions or concerns, please call the clinic at the  number listed above.       Sincerely,        GABY Chou

## 2020-10-01 NOTE — PROGRESS NOTES
Subjective     Nursing Notes:   Eyadvansesa Adwoa, CMA  10/1/2020  3:39 PM  Signed  Negar is here for consult regarding her allergy testing    Pre-visit Screening:  Immunizations:  up to date declines flu shot today  Colonoscopy:  NA  Mammogram: NA  Asthma Action Test/Plan:  NA  PHQ9:  NA  GAD7:  NA  Questioned patient about current smoking habits Pt. has never smoked.  Ok to leave detailed message on voice mail for today's visit only Yes, phone # [phone]          Negar Vizcarra is a 30 year old female who presents to clinic today for the following health issues:    HPI          Negar is here after consult last year with allergist for urticaria. She states she did not understand the results of the testing.    I reviewed this with her today and it looks like she does have thryoid peroxidase antibodies present.     Pts mother does have thyroid disease but pt is uncertain whether her mother takes daily medication.     Pt currently takes fexofenadine for allergies daily.   She does however miss a few days a year due to severe allergies including swollen eyes. She is wondering if I could fill out paperwork for missed work. She missed approx 5 days of work last year due to this       Also noted was Vit D def. She is no longer taking this.     Review of Systems   Constitutional, HEENT, cardiovascular, pulmonary, gi and gu systems are negative, except as otherwise noted.      Patient Active Problem List   Diagnosis     ACP (advance care planning)     Health Care Home     Female infertility     Endometriosis     Breast cyst, right     PVC (premature ventricular contraction)     Recurrent cold sores     Thyroid antibody positive     Vitamin D deficiency     Past Surgical History:   Procedure Laterality Date     BUNIONECTOMY  2012    bilateral     BUNIONECTOMY CHEVRON AND BAILEE, COMBINED Right 6/2/2017    Procedure: COMBINED BUNIONECTOMY CHEVRON AND BAILEE;  Bailee bunionectomy, right foot;  Surgeon: Kristofer Bishop DPM;   Location: [facility]     CYSTECTOMY OVARIAN ONCOLOGY  2008    [place]     LAPAROSCOPIC ABLATION ENDOMETRIOSIS  2/6/2014    Procedure: LAPAROSCOPIC ABLATION ENDOMETRIOSIS;;  Surgeon: Daryl Jackson MD;  Location: [facility]     LAPAROSCOPIC ABLATION ENDOMETRIOSIS N/A 5/24/2018    Procedure: LAPAROSCOPIC ABLATION ENDOMETRIOSIS;;  Surgeon: Renetta Yañez MD;  Location: [facility]     LAPAROSCOPIC TUBAL DYE STUDY  2/6/2014    Procedure: LAPAROSCOPIC TUBAL DYE STUDY;  LAPAROSCOPIC TUBAL DYE STUDIES, Cautery of Endometrosis, Excision Right Pelvic Endometrosis;  Surgeon: Daryl Jackson MD;  Location: [facility]     LAPAROSCOPIC TUBAL DYE STUDY Bilateral 5/24/2018    Procedure: LAPAROSCOPIC TUBAL DYE STUDY;  DIAGNOSTIC LAPAROSCOPY, LAPAROSCOPIC BILATERAL TUBAL DYE STUDIES, CAUTERIZATION OF ENDOMETRIOSIS(;  Surgeon: Renetta Yañez MD;  Location: [facility]     LAPAROTOMY EXPLORATORY         Social History     Tobacco Use     Smoking status: Never Smoker     Smokeless tobacco: Never Used   Substance Use Topics     Alcohol use: No     Family History   Problem Relation Age of Onset     Hypertension Mother      Hypertension Father      Cerebrovascular Disease Father      Lipids Father      Diabetes Maternal Grandmother      Cerebrovascular Disease Maternal Grandfather      Other - See Comments Sister            Current Outpatient Medications   Medication Sig Dispense Refill     acetaminophen (TYLENOL) 325 MG tablet Take 2 tablets (650 mg) by mouth every 4 hours as needed for other (mild pain) 100 tablet 0     Biotin 10 MG CAPS        folic acid (FOLVITE) 1 MG tablet Take 1 mg by mouth daily       ibuprofen (ADVIL/MOTRIN) 600 MG tablet Take 1 tablet (600 mg) by mouth every 6 hours as needed for pain (mild) 30 tablet 0     vitamin E 400 units TABS Take 400 Units by mouth daily       Allergies   Allergen Reactions     Doxycycline Nausea and Nausea and Vomiting     Recent Labs   Lab Test 12/02/19  1240 05/18/17  1054  02/01/14  1844   ALT  --   --  26   CR  --   --  0.75   GFRESTIMATED  --   --  >90   GFRESTBLACK  --   --  >90   POTASSIUM  --   --  4.0   TSH 3.01 1.87  --         BP Readings from Last 3 Encounters:   10/01/20 112/70   12/02/19 120/74   05/24/18 116/75    Wt Readings from Last 3 Encounters:   10/01/20 64 kg (141 lb)   12/02/19 57.8 kg (127 lb 6.4 oz)   05/24/18 61.1 kg (134 lb 11.2 oz)              Objective    /70 (BP Location: Right arm, Patient Position: Sitting, Cuff Size: Adult Large)   Pulse 65   Temp 98.6  F (37  C) (Oral)   Wt 64 kg (141 lb)   SpO2 98%   BMI 24.20 kg/m    Body mass index is 24.2 kg/m .  Physical Exam     GENERAL: healthy, alert and no distress  Head: Normocephalic, atraumatic.  Eyes: Conjunctiva clear, no discharge  Ears: External ears and TMs normal BL.  Nose: Nasal mucosa pink and moist. No discharge.  Mouth / Throat: Mucous membranes moist. Normal dentition.  Pharynx non-erythematous, no exudates.   Neck: Supple, No thyromegaly or nodules. No lymphadenopathy.  RESP: lungs clear to auscultation - no rales, rhonchi or wheezes  CV: regular rate and rhythm, normal S1 S2, no S3 or S4, no murmur, click or rub, no peripheral edema and peripheral pulses strong            Assessment & Plan   1. Thyroid antibody positive  Will call with labs  - TSH ([facility])  - T4 free ([facility])  - VENOUS COLLECTION    2. Vitamin D deficiency  Will call with recommendations  - Vitamin D deficiency screening ([facility])  - VENOUS COLLECTION        Pt to drop off [facility] paperwork that I will fill.     Ce Siddiqi, PA  [facility]

## 2020-10-01 NOTE — LETTER
October 5, 2020      Negar Vizcarra  [address] [facility] [address]        Dear ,    We are writing to inform you of your test results.    {results letter list:[id number]}    Resulted Orders   Vitamin D deficiency screening ([facility])   Result Value Ref Range    Vitamin D 25-OH Total 16 (L) 30 - 100 ng/mL      Comment:      Vitamin D Status         25-OH Vitamin D:     Deficiency:                    <20 ng/mL  Insufficiency:             20 - 29 ng/mL  Optimal:                 > or = 30 ng/mL     For 25-OH Vitamin D testing on patients on   D2-supplementation and patients for whom quantitation   of D2 and D3 fractions is required, the QuestAssureD(TM)  25-OH VIT D, (D2,D3), LC/MS/MS is recommended: order   code 07487 (patients >2yrs).  See Note 1     Note 1     For additional information, please refer to   http://education.[employer]/faq/DTV770   (This link is being provided for informational/  educational purposes only.)     TSH ([facility])   Result Value Ref Range    TSH 1.79 mIU/L      Comment:                Reference Range                         > or = 20 Years  0.40-4.50                              Pregnancy Ranges            First trimester    0.26-2.66            Second trimester   0.55-2.73            Third trimester    0.43-2.91     T4 free ([facility])   Result Value Ref Range    T4 Free, Non-Dialysis 1.2 0.8 - 1.8 ng/dL       If you have any questions or concerns, please call the clinic at the number listed above.       Sincerely,        GABY Chou

## 2020-10-01 NOTE — NURSING NOTE
Negar is here for consult regarding her allergy testing    Pre-visit Screening:  Immunizations:  up to date declines flu shot today  Colonoscopy:  NA  Mammogram: NA  Asthma Action Test/Plan:  NA  PHQ9:  NA  GAD7:  NA  Questioned patient about current smoking habits Pt. has never smoked.  Ok to leave detailed message on voice mail for today's visit only Yes, phone # [phone]

## 2020-10-02 LAB
T4, FREE, NON-DIALYSIS - QUEST: 1.2 NG/DL (ref 0.8–1.8)
TSH SERPL-ACNC: 1.79 MIU/L
VITAMIN D, 25-OH, TOTAL - QUEST: 16 NG/ML (ref 30–100)

## 2020-10-05 ENCOUNTER — TELEPHONE (OUTPATIENT)
Dept: SURGERY | Facility: CLINIC | Age: 30
End: 2020-10-05

## 2020-10-05 ENCOUNTER — TELEPHONE (OUTPATIENT)
Dept: FAMILY MEDICINE | Facility: CLINIC | Age: 30
End: 2020-10-05

## 2020-10-05 DIAGNOSIS — E55.9 VITAMIN D DEFICIENCY: Primary | ICD-10-CM

## 2020-10-05 NOTE — TELEPHONE ENCOUNTER
Vit D low  Start 88471 international unit(s) weekly  Lab only in 3 months    Thyroid normal.    VM left on cell    Letter also sent.    Ce Siddiqi PA-C  10/5/2020

## 2020-10-14 ENCOUNTER — TELEPHONE (OUTPATIENT)
Dept: FAMILY MEDICINE | Facility: CLINIC | Age: 30
End: 2020-10-14

## 2020-10-14 DIAGNOSIS — H10.13 ALLERGIC CONJUNCTIVITIS, BILATERAL: ICD-10-CM

## 2020-10-21 PROBLEM — H10.13 ALLERGIC CONJUNCTIVITIS, BILATERAL: Status: ACTIVE | Noted: 2020-10-21

## 2020-12-22 DIAGNOSIS — E55.9 VITAMIN D DEFICIENCY: ICD-10-CM

## 2020-12-22 RX ORDER — METHOCARBAMOL 750 MG/1
TABLET ORAL
Qty: 12 CAPSULE | Refills: 0 | COMMUNITY
Start: 2020-12-22

## 2020-12-22 NOTE — TELEPHONE ENCOUNTER
Received incoming refill request for  Pending Prescriptions:                       Disp   Refills    D3-50 1.25 MG (09495 UT) capsule [Pharmac*12 cap*0            Sig: TAKE 1 CAPSULE BY MOUTH ONCE A WEEK    Patient last had a refill of this medication on 10/5/20 for 12 capsules. The patient is due to come in for a lab only to check vitamin D after taking first round of this. This has not been done yet so will deny for now until lab is done to see where patients Vitamin D level is at.

## 2021-01-13 ENCOUNTER — TELEPHONE (OUTPATIENT)
Dept: FAMILY MEDICINE | Facility: CLINIC | Age: 31
End: 2021-01-13

## 2021-01-13 NOTE — TELEPHONE ENCOUNTER
----- Message from GABY Chou sent at 1/13/2021  7:30 AM CST -----  Regarding: pending Vit D  Please call pt to come in and have Vit D level rechecked  Orders in standing    Thanks,  Ce Siddiqi PA-C

## 2021-01-18 DIAGNOSIS — E55.9 VITAMIN D DEFICIENCY: ICD-10-CM

## 2021-01-18 PROCEDURE — 82306 VITAMIN D 25 HYDROXY: CPT | Mod: 90 | Performed by: PHYSICIAN ASSISTANT

## 2021-01-18 PROCEDURE — 36415 COLL VENOUS BLD VENIPUNCTURE: CPT | Performed by: PHYSICIAN ASSISTANT

## 2021-01-18 NOTE — LETTER
January 19, 2021      [place]izcarra  [address] [facility] [address]        Dear ,    We are writing to inform you of your test results.    Vitamin D is back to normal.  Please take 2000 international unit(s) Vitamin D3 every day indefinitely.     Resulted Orders   Vitamin D Deficiency   Result Value Ref Range    Vitamin D 25-OH Total 50 30 - 100 ng/mL      Comment:      Vitamin D Status         25-OH Vitamin D:     Deficiency:                    <20 ng/mL  Insufficiency:             20 - 29 ng/mL  Optimal:                 > or = 30 ng/mL     For 25-OH Vitamin D testing on patients on   D2-supplementation and patients for whom quantitation   of D2 and D3 fractions is required, the QuestAssureD(TM)  25-OH VIT D, (D2,D3), LC/MS/MS is recommended: order   code 23152 (patients >2yrs).  See Note 1     Note 1     For additional information, please refer to   http://education.[employer].DigitalTangible/faq/YJI907   (This link is being provided for informational/  educational purposes only.)         If you have any questions or concerns, please call the clinic at the number listed above.   Sincerely,  GABY Chou

## 2021-01-19 LAB — VITAMIN D, 25-OH, TOTAL - QUEST: 50 NG/ML (ref 30–100)

## 2023-07-28 ENCOUNTER — MEDICAL CORRESPONDENCE (OUTPATIENT)
Dept: HEALTH INFORMATION MANAGEMENT | Facility: CLINIC | Age: 33
End: 2023-07-28

## 2023-07-31 DIAGNOSIS — I10 HTN (HYPERTENSION): Primary | ICD-10-CM

## 2024-10-08 NOTE — PROGRESS NOTES
Preventive Care Visit  [facility] [place]  Kailey Tena PA-C, Family Medicine  Oct 11, 2024       SUBJECTIVE:   Negar is a 34 year old, presenting for the following:  New Patient (Has not been seen at [facility] in over 3 years ) and Physical (Annual, fasting, due for tdap but patient declined, due for pap )      HPI    Negar presents with [place]  for her yearly physical.     Daily medications: Reviewed.     Concerns: Negar's primary concern today is infertility concerns. Notes she has been trying to have a baby for the last 17 years however has a history of endometriosis which she has had multiple ablations for which seems to have been helpful for her pain however she and her  are still having troubles conceiving. She would like to have her hormone levels checked and a full gynecological check up to see if there is anything else that can help them conceive. She has not seen an OBGYN provider since her last ablation in 05/2018.     Negar also notes she has a long standing history of high blood pressure. Notes she has a blood pressure cuff at home and that when she checks her BP it is always >140/90. She has never been on any HTN medications before, however notes that almost everyone in her family has had high blood pressure; mom, father, sister, and maternal aunt who passed away from a stroke in her early 30's. Negar denies any symptoms of chest pain or SOB.     Negar also would like a prescription of Valtrex to use PRN for cold sores, notes she gets these randomly throughout the year and would like something to quickly resolve them.     Past medical history and daily medications reviewed. Reviewed past medical history, surgical history, family history, and social history below.     Social history:  -Diet: Tries to eat a well balanced diet, primarily has chicken, beef, and fish for protein, good amount of fruits and vegetables, cheese and yogurt for calcium.   -Water: Tries to drink a  good amount of water throughout the day, 2 cups of coffee per day and also has tea, no juice or soda.   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   -Sleep: Sleep schedule is off due to working night shift 4 times a week, has been doing this for the last 2 years, denies any sleep apnea symptoms.   -Daily vitamins: Vitamin D, vitamin A, and biotin.   -Dentist: Twice a year.   -Eye doctor: Every year.   -Smoking: None.   -Alcohol: None.   -LMP: 10/01/24, regular cycles (every 23 days), notes are still painful even after ablations.      Screenings:  -Immunizations: Due for DTAP, declines influenza.   -Lab work: CBC, CMP, lipid, TSH/T4.   -Pap smear: Due, completed today.     Have you ever done Advance Care Planning? (For example, a Health Directive, POLST, or a discussion with a medical provider or your loved ones about your wishes): No, advance care planning information given to patient to review.  Patient declined advance care planning discussion at this time.    Social History     Tobacco Use    Smoking status: Never    Smokeless tobacco: Never   Substance Use Topics    Alcohol use: No     Reviewed orders with patient.  Reviewed health maintenance and updated orders accordingly - Yes    Lab work is in process.     Breast Cancer Screening: Any new diagnosis of family breast, ovarian, or bowel cancer? No     Mammogram Screening - Patient under 40 years of age: Routine Mammogram Screening not recommended.     History of abnormal Pap smear: No - age 30- 64 PAP with HPV every 5 years recommended.     Reviewed and updated as needed this visit by clinical staff   Tobacco  Allergies  Meds   Med Hx  Surg Hx  Fam Hx  Soc Hx      Reviewed and updated as needed this visit by Provider   Tobacco  Allergies  Meds   Med Hx  Surg Hx  Fam Hx  Soc Hx Sexual   Activity        Review of Systems  CONSTITUTIONAL: NEGATIVE for fever, chills, change in  "weight  INTEGUMENTARY/SKIN: NEGATIVE for worrisome rashes, moles or lesions  EYES: NEGATIVE for vision changes or irritation  ENT/MOUTH: NEGATIVE for ear, mouth and throat problems  RESP: NEGATIVE for significant cough or SOB  BREAST: NEGATIVE for masses, tenderness or discharge  CV: NEGATIVE for chest pain, palpitations or peripheral edema  GI: NEGATIVE for nausea, abdominal pain, heartburn, or change in bowel habits  : NEGATIVE for frequency, dysuria, or hematuria  MUSCULOSKELETAL: NEGATIVE for significant arthralgias or myalgia  NEURO: NEGATIVE for weakness, dizziness or paresthesias  ENDOCRINE: NEGATIVE for temperature intolerance, skin/hair changes  HEME: NEGATIVE for bleeding problems  PSYCHIATRIC: NEGATIVE for changes in mood or affect    OBJECTIVE:   BP (!) 141/99 (BP Location: Right arm, Patient Position: Sitting, Cuff Size: Adult Large)   Pulse 66   Resp 18   Ht 1.626 m (5' 4\")   Wt 58.5 kg (129 lb)   LMP 10/01/2024 (Exact Date)   BMI 22.14 kg/m     Estimated body mass index is 22.14 kg/m  as calculated from the following:    Height as of this encounter: 1.626 m (5' 4\").    Weight as of this encounter: 58.5 kg (129 lb).    Physical Exam  GENERAL: alert and no distress  EYES: Eyes grossly normal to inspection, PERRL and conjunctivae and sclerae normal  HENT: ear canals and TM's normal, nose and mouth without ulcers or lesions  NECK: no adenopathy, no asymmetry, masses, or scars  RESP: lungs clear to auscultation - no rales, rhonchi or wheezes  BREAST: normal without masses, tenderness or nipple discharge and no palpable axillary masses or adenopathy  CV: regular rate and rhythm, normal S1 S2, no S3 or S4, no murmur, click or rub, no peripheral edema  ABDOMEN: soft, nontender, no hepatosplenomegaly, no masses and bowel sounds normal   (female) w/bimanual: normal female external genitalia, normal urethral meatus, normal vaginal mucosa, and normal cervix/adnexa/uterus without masses or discharge, pap " obtained   MS: no gross musculoskeletal defects noted, no edema  SKIN: no suspicious lesions or rashes  NEURO: Normal strength and tone, mentation intact and speech normal  PSYCH: mentation appears normal, affect normal/bright    Lab work pending.     ASSESSMENT/PLAN:     Well woman exam with routine gynecological exam  - Negar is overall doing well today. Encouraged healthy eating habits, daily exercise, reviewed screenings and immunizations, etc.     Essential hypertension  - Recommend Negar start BP medications given her long standing history of HTN, she is in agreement. RX for Losartan 50 mg daily sent for one month. Negar will check her BP daily at home and keep a log of her values and follow up in a month for a recheck. Side effects of medication reviewed. CMP pending. Return to clinic and ER precautions discussed.   - losartan (COZAAR) 50 MG tablet; Take 1 tablet (50 mg) by mouth daily.    Recurrent cold sores  - RX for Valtrex PRN for cold sores sent, discussed how to take medication.   - valACYclovir (VALTREX) 1000 mg tablet; Take 2 tablets (2,000 mg) by mouth 2 times daily for 1 day. As needed for cold sores    Female infertility  - Referral placed to OBGYN specialists for infertility concerns and endometriosis, recommend Negar start taking a daily prenatal multivitamin.   - Ob/Gyn  Referral - To a [facility] Location (Use POS/Location)    Endometriosis  - See above.   - Ob/Gyn  Referral - To a [facility] Location (Use POS/Location)    Screening for deficiency anemia  - Labs pending, patient will be notified of results.   - VENOUS COLLECTION  - Hemogram Platelet ([facility])    Screening for metabolic disorder  - Labs pending, patient will be notified of results.   - VENOUS COLLECTION  - Comprehensive Metobolic Panel ([facility])    Screening for lipid disorders  - Labs pending, patient will be notified of results.   - VENOUS COLLECTION  - Lipid Panel ([facility])    Screening for  thyroid disorder  - Labs pending, patient will be notified of results.   - VENOUS COLLECTION  - TSH WITH FREE T4 REFLEX ([facility])    Encounter for screening for cervical cancer  - Pap obtained, patient will be notified of results.   - THINPREP TIS PAP AND HPV mRNA E6/E7 ([facility])    Encounter for immunization  - TDAP VACCINE (Adacel, Boostrix)  [[id number]]    Patient has been advised of split billing requirements and indicates understanding: Yes    Counseling  Reviewed preventive health counseling, as reflected in patient instructions       Regular exercise       Healthy diet/nutrition       Vision screening       Immunizations  Vaccinated for: TDAP and Declined: Influenza due to Concerns about side effects/safety           Alcohol Use       Contraception       Family planning       Folic Acid       Osteoporosis prevention/bone health       Safe sex practices/STD prevention       Colorectal Cancer Screening       Consider Hep C screening for all patients one time for ages 18-79 years       HIV screeninx in teen years, 1x in adult years, and at intervals if high risk       Advance Care Planning    She reports that she has never smoked. She has never used smokeless tobacco.            Signed Electronically by: Kailey Tena PA-C

## 2024-10-11 ENCOUNTER — OFFICE VISIT (OUTPATIENT)
Dept: FAMILY MEDICINE | Facility: CLINIC | Age: 34
End: 2024-10-11

## 2024-10-11 VITALS
WEIGHT: 129 LBS | DIASTOLIC BLOOD PRESSURE: 99 MMHG | RESPIRATION RATE: 18 BRPM | BODY MASS INDEX: 22.02 KG/M2 | HEIGHT: 64 IN | HEART RATE: 66 BPM | SYSTOLIC BLOOD PRESSURE: 141 MMHG

## 2024-10-11 DIAGNOSIS — I10 ESSENTIAL HYPERTENSION: ICD-10-CM

## 2024-10-11 DIAGNOSIS — N97.9 FEMALE INFERTILITY: ICD-10-CM

## 2024-10-11 DIAGNOSIS — Z23 ENCOUNTER FOR IMMUNIZATION: ICD-10-CM

## 2024-10-11 DIAGNOSIS — N80.9 ENDOMETRIOSIS: ICD-10-CM

## 2024-10-11 DIAGNOSIS — Z13.29 SCREENING FOR THYROID DISORDER: ICD-10-CM

## 2024-10-11 DIAGNOSIS — Z13.228 SCREENING FOR METABOLIC DISORDER: ICD-10-CM

## 2024-10-11 DIAGNOSIS — Z13.220 SCREENING FOR LIPID DISORDERS: ICD-10-CM

## 2024-10-11 DIAGNOSIS — Z13.0 SCREENING FOR DEFICIENCY ANEMIA: ICD-10-CM

## 2024-10-11 DIAGNOSIS — Z12.4 ENCOUNTER FOR SCREENING FOR CERVICAL CANCER: ICD-10-CM

## 2024-10-11 DIAGNOSIS — B00.1 RECURRENT COLD SORES: ICD-10-CM

## 2024-10-11 DIAGNOSIS — Z01.419 WELL WOMAN EXAM WITH ROUTINE GYNECOLOGICAL EXAM: Primary | ICD-10-CM

## 2024-10-11 LAB
ALBUMIN SERPL-MCNC: 4.7 G/DL (ref 3.6–5.1)
ALP SERPL-CCNC: 45 U/L (ref 33–130)
ALT 1742-6: 9 U/L (ref 0–32)
AST 1920-8: 11 U/L (ref 0–35)
BILIRUB SERPL-MCNC: 0.8 MG/DL (ref 0.2–1.2)
BUN SERPL-MCNC: 11 MG/DL (ref 7–25)
BUN/CREATININE RATIO: 13 (ref 6–32)
CALCIUM SERPL-MCNC: 9.5 MG/DL (ref 8.6–10.3)
CHLORIDE SERPLBLD-SCNC: 102.9 MMOL/L (ref 98–110)
CHOLEST SERPL-MCNC: 235 MG/DL (ref 0–199)
CHOLEST/HDLC SERPL: 3 {RATIO} (ref 0–5)
CO2 SERPL-SCNC: 26.3 MMOL/L (ref 20–32)
CREAT SERPL-MCNC: 0.87 MG/DL (ref 0.6–1.3)
ERYTHROCYTE [DISTWIDTH] IN BLOOD BY AUTOMATED COUNT: 12.6 %
GLUCOSE SERPL-MCNC: 75 MG/DL (ref 60–99)
HCT VFR BLD AUTO: 41.9 % (ref 35–47)
HDLC SERPL-MCNC: 78 MG/DL (ref 40–150)
HEMOGLOBIN: 13.7 G/DL (ref 11.7–15.7)
LDLC SERPL CALC-MCNC: 142 MG/DL (ref 0–129)
MCH RBC QN AUTO: 30.7 PG (ref 26–33)
MCHC RBC AUTO-ENTMCNC: 32.7 G/DL (ref 31–36)
MCV RBC AUTO: 94 FL (ref 78–100)
PLATELET COUNT - QUEST: 304 10^9/L (ref 150–375)
POTASSIUM SERPL-SCNC: 4.68 MMOL/L (ref 3.5–5.3)
PROT SERPL-MCNC: 7.4 G/DL (ref 6.1–8.1)
RBC # BLD AUTO: 4.46 10*12/L (ref 3.8–5.2)
SODIUM SERPL-SCNC: 138.4 MMOL/L (ref 135–146)
TRIGL SERPL-MCNC: 74 MG/DL (ref 0–149)
WBC # BLD AUTO: 8.9 10*9/L (ref 4–11)

## 2024-10-11 PROCEDURE — 36415 COLL VENOUS BLD VENIPUNCTURE: CPT

## 2024-10-11 PROCEDURE — 99203 OFFICE O/P NEW LOW 30 MIN: CPT | Mod: 25

## 2024-10-11 PROCEDURE — 99385 PREV VISIT NEW AGE 18-39: CPT | Mod: 25

## 2024-10-11 PROCEDURE — 80053 COMPREHEN METABOLIC PANEL: CPT

## 2024-10-11 PROCEDURE — 85027 COMPLETE CBC AUTOMATED: CPT

## 2024-10-11 PROCEDURE — 99459 PELVIC EXAMINATION: CPT

## 2024-10-11 PROCEDURE — 90715 TDAP VACCINE 7 YRS/> IM: CPT

## 2024-10-11 PROCEDURE — 90471 IMMUNIZATION ADMIN: CPT

## 2024-10-11 PROCEDURE — 80061 LIPID PANEL: CPT

## 2024-10-11 RX ORDER — VALACYCLOVIR HYDROCHLORIDE 1 G/1
2000 TABLET, FILM COATED ORAL 2 TIMES DAILY
Qty: 20 TABLET | Refills: 1 | Status: SHIPPED | OUTPATIENT
Start: 2024-10-11 | End: 2024-10-12

## 2024-10-11 RX ORDER — LOSARTAN POTASSIUM 50 MG/1
50 TABLET ORAL DAILY
Qty: 30 TABLET | Refills: 0 | Status: SHIPPED | OUTPATIENT
Start: 2024-10-11

## 2024-10-11 NOTE — PATIENT INSTRUCTIONS
Thanks for coming in today Negar.     I will send you a letter in the mail with your lab and pap smear results.     Recommend you starting taking a daily prenatal multivitamin.     OBGYN referral placed.     Please start taking Losartan 50 mg daily for your blood pressure and follow up in one month for a recheck.

## 2024-10-11 NOTE — NURSING NOTE
Chief Complaint   Patient presents with    New Patient     Has not been seen at [facility] in over 3 years     Physical     Annual, fasting, due for tdap but patient declined, due for pap      Pre-visit Screening:  Immunizations:  not up to date - due for tdap but patient declines   Colonoscopy:  na  Mammogram: na  Asthma Action Test/Plan:  na  PHQ9:  phq2 done today   GAD7:  na  Questioned patient about current smoking habits Pt. has never smoked.  Ok to leave detailed message on voice mail for today's visit only yes, phone # [phone] (home)

## 2024-10-12 LAB — TSH SERPL-ACNC: 1.97 MIU/L

## 2024-10-15 PROBLEM — E78.2 MIXED HYPERLIPIDEMIA: Status: ACTIVE | Noted: 2024-10-15

## 2024-10-16 LAB
CLINICAL HISTORY - QUEST: NORMAL
COMMENT - QUEST: NORMAL
COMMENT - QUEST: NORMAL
CYTOTECHNOLOGIST - QUEST: NORMAL
DESCRIPTIVE DIAGNOSIS - QUEST: NORMAL
HPV MRNA E6/E7: NOT DETECTED
LAST PAP DX - QUEST: NORMAL
LMP - QUEST: NORMAL
PATHOLOGIST - QUEST: NORMAL
PREV BX DX - QUEST: NORMAL
SOURCE: NORMAL
STATEMENT OF ADEQUACY - QUEST: NORMAL

## 2106-12-21 ENCOUNTER — TRANSFERRED RECORDS (OUTPATIENT)
Dept: FAMILY MEDICINE | Facility: CLINIC | Age: OVER 89
End: 2106-12-21

## (undated) DEVICE — SOL NACL 0.9% IRRIG 1000ML BOTTLE 07138-09

## (undated) DEVICE — SU VICRYL 3-0 SH 27" UND J416H

## (undated) DEVICE — TUBING SMOKE EVAC PNEUVIEW 9660-XE

## (undated) DEVICE — KIT PATIENT POSITIONING PIGAZZI LATEX FREE 40580

## (undated) DEVICE — SOL WATER IRRIG 1000ML BOTTLE 2F7114

## (undated) DEVICE — ESU GROUND PAD UNIVERSAL W/O CORD

## (undated) DEVICE — PACK LOWER EXTREMITY RIDGES

## (undated) DEVICE — DRSG GAUZE 4X4" TRAY

## (undated) DEVICE — DRILL BIT MINI 100MM QC 2.7MM  310.26

## (undated) DEVICE — Device

## (undated) DEVICE — ENDO TROCAR 05MM STEP S101005

## (undated) DEVICE — BLADE KNIFE SURG 15 371115

## (undated) DEVICE — TUBING C02 INSUFFLATION LAP FILTER HEATER 6198

## (undated) DEVICE — ENDO TROCAR 05MM VERSASTEP VS101005

## (undated) DEVICE — PREP CHLORAPREP 26ML TINTED ORANGE  260815

## (undated) DEVICE — WIRE C 0.045X5" 5050-045

## (undated) DEVICE — GLOVE PROTEXIS W/NEU-THERA 6.5  2D73TE65

## (undated) DEVICE — SUCTION CANISTER MEDIVAC LINER 3000ML W/LID 65651-530

## (undated) DEVICE — SU DERMABOND MINI DHVM12

## (undated) DEVICE — NDL INSUFFLATION 14GA STEP S100000

## (undated) DEVICE — LINEN TOWEL PACK X10 5473

## (undated) DEVICE — ENDO TROCAR 10MM STEP S101010

## (undated) DEVICE — EVAC SYSTEM CLEAR FLOW SC082500

## (undated) DEVICE — SYR 10ML FINGER CONTROL W/O NDL 309695

## (undated) DEVICE — SUCTION CANISTER STRAW 65652-008

## (undated) DEVICE — SUCTION IRR STRYKERFLOW II W/TIP 250-070-520

## (undated) DEVICE — SOL ADH LIQUID BENZOIN SWAB 0.6ML C1544

## (undated) DEVICE — DRSG ADAPTIC 3X3" 6112

## (undated) DEVICE — TOURNIQUET CUFF 12" STERILE 60-7070-102

## (undated) DEVICE — CAST PADDING 6" STERILE 9046S

## (undated) DEVICE — GLOVE PROTEXIS POWDER FREE 7.5 ORTHOPEDIC 2D73ET75

## (undated) DEVICE — ESU CORD MONOPOLAR 10'  E0510

## (undated) DEVICE — SU VICRYL 4-0 P-3 18" UND J494G

## (undated) DEVICE — BNDG ELASTIC 4"X5YDS UNSTERILE 6611-40

## (undated) DEVICE — LINEN ORTHO PACK 5446

## (undated) DEVICE — DRSG STERI STRIP 1/4X3" R1541

## (undated) DEVICE — LINEN TOWEL PACK X5 5464

## (undated) DEVICE — SOL NACL 0.9% INJ 1000ML BAG 2B1324X

## (undated) DEVICE — NDL BLUNT 18GA 1" W/O FILTER 305181

## (undated) DEVICE — SU VICRYL 0 UR-6 27" J603H

## (undated) DEVICE — BLADE SAW SAGITTAL 25.5X9.5X.4MM FINE LINVATEC 5023-138

## (undated) DEVICE — SU VICRYL 5-0 P-3 18" UND J493G

## (undated) DEVICE — TOURNIQUET CUFF 18" REPRO RED 60-7070-103

## (undated) DEVICE — DRSG KERLIX 4 1/2"X4YDS ROLL 6730

## (undated) DEVICE — DRILL BIT MINI 110MM QC 2.0MM  310.19

## (undated) DEVICE — SU MONOCRYL 4-0 PS-2 18" UND Y496G

## (undated) DEVICE — NDL 25GA 1.5" 305127

## (undated) DEVICE — PREP DURAPREP 26ML APL 8630

## (undated) DEVICE — ESU GROUND PAD ADULT W/CORD E7507

## (undated) DEVICE — ESU HOLDER LAP INST DISP PURPLE LONG 330MM H-PRO-330

## (undated) DEVICE — GLOVE PROTEXIS BLUE W/NEU-THERA 6.5  2D73EB65

## (undated) DEVICE — GLOVE PROTEXIS W/NEU-THERA 6.0  2D73TE60

## (undated) RX ORDER — FENTANYL CITRATE 50 UG/ML
INJECTION, SOLUTION INTRAMUSCULAR; INTRAVENOUS
Status: DISPENSED
Start: 2018-05-24

## (undated) RX ORDER — LIDOCAINE HYDROCHLORIDE 10 MG/ML
INJECTION, SOLUTION EPIDURAL; INFILTRATION; INTRACAUDAL; PERINEURAL
Status: DISPENSED
Start: 2017-06-02

## (undated) RX ORDER — DEXAMETHASONE SODIUM PHOSPHATE 4 MG/ML
INJECTION, SOLUTION INTRA-ARTICULAR; INTRALESIONAL; INTRAMUSCULAR; INTRAVENOUS; SOFT TISSUE
Status: DISPENSED
Start: 2018-05-24

## (undated) RX ORDER — GLYCOPYRROLATE 0.2 MG/ML
INJECTION INTRAMUSCULAR; INTRAVENOUS
Status: DISPENSED
Start: 2017-06-02

## (undated) RX ORDER — HYDROCODONE BITARTRATE AND ACETAMINOPHEN 5; 325 MG/1; MG/1
TABLET ORAL
Status: DISPENSED
Start: 2017-06-02

## (undated) RX ORDER — CEFAZOLIN SODIUM 2 G/100ML
INJECTION, SOLUTION INTRAVENOUS
Status: DISPENSED
Start: 2017-06-02

## (undated) RX ORDER — FENTANYL CITRATE 50 UG/ML
INJECTION, SOLUTION INTRAMUSCULAR; INTRAVENOUS
Status: DISPENSED
Start: 2017-06-02

## (undated) RX ORDER — PROPOFOL 10 MG/ML
INJECTION, EMULSION INTRAVENOUS
Status: DISPENSED
Start: 2018-05-24

## (undated) RX ORDER — GLYCOPYRROLATE 0.2 MG/ML
INJECTION, SOLUTION INTRAMUSCULAR; INTRAVENOUS
Status: DISPENSED
Start: 2018-05-24

## (undated) RX ORDER — ONDANSETRON 2 MG/ML
INJECTION INTRAMUSCULAR; INTRAVENOUS
Status: DISPENSED
Start: 2017-06-02

## (undated) RX ORDER — NEOSTIGMINE METHYLSULFATE 1 MG/ML
VIAL (ML) INJECTION
Status: DISPENSED
Start: 2018-05-24

## (undated) RX ORDER — ACETAMINOPHEN 325 MG/1
TABLET ORAL
Status: DISPENSED
Start: 2018-05-24

## (undated) RX ORDER — LIDOCAINE HYDROCHLORIDE 20 MG/ML
INJECTION, SOLUTION INFILTRATION; PERINEURAL
Status: DISPENSED
Start: 2017-06-02

## (undated) RX ORDER — PROPOFOL 10 MG/ML
INJECTION, EMULSION INTRAVENOUS
Status: DISPENSED
Start: 2017-06-02

## (undated) RX ORDER — CEFAZOLIN SODIUM 2 G/100ML
INJECTION, SOLUTION INTRAVENOUS
Status: DISPENSED
Start: 2018-05-24

## (undated) RX ORDER — LIDOCAINE HYDROCHLORIDE 20 MG/ML
INJECTION, SOLUTION EPIDURAL; INFILTRATION; INTRACAUDAL; PERINEURAL
Status: DISPENSED
Start: 2018-05-24

## (undated) RX ORDER — ONDANSETRON 2 MG/ML
INJECTION INTRAMUSCULAR; INTRAVENOUS
Status: DISPENSED
Start: 2018-05-24

## (undated) RX ORDER — DEXAMETHASONE SODIUM PHOSPHATE 4 MG/ML
INJECTION, SOLUTION INTRA-ARTICULAR; INTRALESIONAL; INTRAMUSCULAR; INTRAVENOUS; SOFT TISSUE
Status: DISPENSED
Start: 2017-06-02

## (undated) RX ORDER — BUPIVACAINE HYDROCHLORIDE 5 MG/ML
INJECTION, SOLUTION EPIDURAL; INTRACAUDAL
Status: DISPENSED
Start: 2017-06-02